# Patient Record
Sex: FEMALE | Race: BLACK OR AFRICAN AMERICAN | NOT HISPANIC OR LATINO | URBAN - METROPOLITAN AREA
[De-identification: names, ages, dates, MRNs, and addresses within clinical notes are randomized per-mention and may not be internally consistent; named-entity substitution may affect disease eponyms.]

---

## 2023-05-24 ENCOUNTER — INPATIENT (INPATIENT)
Facility: HOSPITAL | Age: 28
LOS: 2 days | Discharge: HOME | DRG: 818 | End: 2023-05-27
Attending: OBSTETRICS & GYNECOLOGY | Admitting: OBSTETRICS & GYNECOLOGY
Payer: COMMERCIAL

## 2023-05-24 ENCOUNTER — APPOINTMENT (OUTPATIENT)
Dept: CARDIOLOGY | Facility: CLINIC | Age: 28
End: 2023-05-24
Payer: COMMERCIAL

## 2023-05-24 ENCOUNTER — NON-APPOINTMENT (OUTPATIENT)
Age: 28
End: 2023-05-24

## 2023-05-24 ENCOUNTER — APPOINTMENT (OUTPATIENT)
Dept: ELECTROPHYSIOLOGY | Facility: CLINIC | Age: 28
End: 2023-05-24
Payer: COMMERCIAL

## 2023-05-24 VITALS
HEART RATE: 104 BPM | SYSTOLIC BLOOD PRESSURE: 110 MMHG | OXYGEN SATURATION: 99 % | HEIGHT: 66 IN | BODY MASS INDEX: 31.5 KG/M2 | WEIGHT: 196 LBS | DIASTOLIC BLOOD PRESSURE: 70 MMHG | TEMPERATURE: 98.1 F

## 2023-05-24 VITALS — DIASTOLIC BLOOD PRESSURE: 70 MMHG | SYSTOLIC BLOOD PRESSURE: 110 MMHG

## 2023-05-24 VITALS
SYSTOLIC BLOOD PRESSURE: 107 MMHG | HEIGHT: 66 IN | RESPIRATION RATE: 20 BRPM | OXYGEN SATURATION: 98 % | TEMPERATURE: 98 F | DIASTOLIC BLOOD PRESSURE: 74 MMHG | WEIGHT: 195.99 LBS | HEART RATE: 103 BPM

## 2023-05-24 VITALS
WEIGHT: 194 LBS | HEART RATE: 93 BPM | BODY MASS INDEX: 31.18 KG/M2 | SYSTOLIC BLOOD PRESSURE: 114 MMHG | OXYGEN SATURATION: 99 % | DIASTOLIC BLOOD PRESSURE: 81 MMHG | HEIGHT: 66 IN

## 2023-05-24 DIAGNOSIS — Z87.42 PERSONAL HISTORY OF OTHER DISEASES OF THE FEMALE GENITAL TRACT: ICD-10-CM

## 2023-05-24 DIAGNOSIS — Z87.19 PERSONAL HISTORY OF OTHER DISEASES OF THE DIGESTIVE SYSTEM: ICD-10-CM

## 2023-05-24 DIAGNOSIS — E28.2 POLYCYSTIC OVARIAN SYNDROME: ICD-10-CM

## 2023-05-24 DIAGNOSIS — Z98.890 OTHER SPECIFIED POSTPROCEDURAL STATES: Chronic | ICD-10-CM

## 2023-05-24 DIAGNOSIS — Z82.49 FAMILY HISTORY OF ISCHEMIC HEART DISEASE AND OTHER DISEASES OF THE CIRCULATORY SYSTEM: ICD-10-CM

## 2023-05-24 DIAGNOSIS — R55 SYNCOPE AND COLLAPSE: ICD-10-CM

## 2023-05-24 DIAGNOSIS — Z3A.30 30 WEEKS GESTATION OF PREGNANCY: ICD-10-CM

## 2023-05-24 DIAGNOSIS — Z78.9 OTHER SPECIFIED HEALTH STATUS: ICD-10-CM

## 2023-05-24 PROBLEM — Z00.00 ENCOUNTER FOR PREVENTIVE HEALTH EXAMINATION: Status: ACTIVE | Noted: 2023-05-24

## 2023-05-24 LAB
ALBUMIN SERPL ELPH-MCNC: 3.7 G/DL — SIGNIFICANT CHANGE UP (ref 3.3–5)
ALP SERPL-CCNC: 93 U/L — SIGNIFICANT CHANGE UP (ref 40–120)
ALT FLD-CCNC: 37 U/L — SIGNIFICANT CHANGE UP (ref 10–45)
ANION GAP SERPL CALC-SCNC: 13 MMOL/L — SIGNIFICANT CHANGE UP (ref 5–17)
APPEARANCE UR: ABNORMAL
AST SERPL-CCNC: 20 U/L — SIGNIFICANT CHANGE UP (ref 10–40)
BACTERIA # UR AUTO: ABNORMAL
BASOPHILS # BLD AUTO: 0.02 K/UL — SIGNIFICANT CHANGE UP (ref 0–0.2)
BASOPHILS NFR BLD AUTO: 0.2 % — SIGNIFICANT CHANGE UP (ref 0–2)
BILIRUB SERPL-MCNC: 0.1 MG/DL — LOW (ref 0.2–1.2)
BILIRUB UR-MCNC: NEGATIVE — SIGNIFICANT CHANGE UP
BUN SERPL-MCNC: 7 MG/DL — SIGNIFICANT CHANGE UP (ref 7–23)
CALCIUM SERPL-MCNC: 9.1 MG/DL — SIGNIFICANT CHANGE UP (ref 8.4–10.5)
CHLORIDE SERPL-SCNC: 105 MMOL/L — SIGNIFICANT CHANGE UP (ref 96–108)
CO2 SERPL-SCNC: 19 MMOL/L — LOW (ref 22–31)
COLOR SPEC: YELLOW — SIGNIFICANT CHANGE UP
CREAT SERPL-MCNC: 0.58 MG/DL — SIGNIFICANT CHANGE UP (ref 0.5–1.3)
DIFF PNL FLD: NEGATIVE — SIGNIFICANT CHANGE UP
EGFR: 126 ML/MIN/1.73M2 — SIGNIFICANT CHANGE UP
EOSINOPHIL # BLD AUTO: 0.03 K/UL — SIGNIFICANT CHANGE UP (ref 0–0.5)
EOSINOPHIL NFR BLD AUTO: 0.3 % — SIGNIFICANT CHANGE UP (ref 0–6)
EPI CELLS # UR: 7 /HPF — HIGH
GLUCOSE SERPL-MCNC: 101 MG/DL — HIGH (ref 70–99)
GLUCOSE UR QL: NEGATIVE — SIGNIFICANT CHANGE UP
HCT VFR BLD CALC: 35.1 % — SIGNIFICANT CHANGE UP (ref 34.5–45)
HGB BLD-MCNC: 11.1 G/DL — LOW (ref 11.5–15.5)
HYALINE CASTS # UR AUTO: 23 /LPF — HIGH (ref 0–2)
IMM GRANULOCYTES NFR BLD AUTO: 1.8 % — HIGH (ref 0–0.9)
KETONES UR-MCNC: NEGATIVE — SIGNIFICANT CHANGE UP
LEUKOCYTE ESTERASE UR-ACNC: ABNORMAL
LYMPHOCYTES # BLD AUTO: 1.69 K/UL — SIGNIFICANT CHANGE UP (ref 1–3.3)
LYMPHOCYTES # BLD AUTO: 18 % — SIGNIFICANT CHANGE UP (ref 13–44)
MCHC RBC-ENTMCNC: 26.5 PG — LOW (ref 27–34)
MCHC RBC-ENTMCNC: 31.6 GM/DL — LOW (ref 32–36)
MCV RBC AUTO: 83.8 FL — SIGNIFICANT CHANGE UP (ref 80–100)
MONOCYTES # BLD AUTO: 0.57 K/UL — SIGNIFICANT CHANGE UP (ref 0–0.9)
MONOCYTES NFR BLD AUTO: 6.1 % — SIGNIFICANT CHANGE UP (ref 2–14)
NEUTROPHILS # BLD AUTO: 6.9 K/UL — SIGNIFICANT CHANGE UP (ref 1.8–7.4)
NEUTROPHILS NFR BLD AUTO: 73.6 % — SIGNIFICANT CHANGE UP (ref 43–77)
NITRITE UR-MCNC: NEGATIVE — SIGNIFICANT CHANGE UP
NRBC # BLD: 0 /100 WBCS — SIGNIFICANT CHANGE UP (ref 0–0)
PH UR: 6.5 — SIGNIFICANT CHANGE UP (ref 5–8)
PLATELET # BLD AUTO: 185 K/UL — SIGNIFICANT CHANGE UP (ref 150–400)
POTASSIUM SERPL-MCNC: 3.7 MMOL/L — SIGNIFICANT CHANGE UP (ref 3.5–5.3)
POTASSIUM SERPL-SCNC: 3.7 MMOL/L — SIGNIFICANT CHANGE UP (ref 3.5–5.3)
PROT SERPL-MCNC: 6.8 G/DL — SIGNIFICANT CHANGE UP (ref 6–8.3)
PROT UR-MCNC: ABNORMAL
RBC # BLD: 4.19 M/UL — SIGNIFICANT CHANGE UP (ref 3.8–5.2)
RBC # FLD: 14.2 % — SIGNIFICANT CHANGE UP (ref 10.3–14.5)
RBC CASTS # UR COMP ASSIST: 2 /HPF — SIGNIFICANT CHANGE UP (ref 0–4)
SODIUM SERPL-SCNC: 137 MMOL/L — SIGNIFICANT CHANGE UP (ref 135–145)
SP GR SPEC: 1.03 — HIGH (ref 1.01–1.02)
UROBILINOGEN FLD QL: ABNORMAL
WBC # BLD: 9.38 K/UL — SIGNIFICANT CHANGE UP (ref 3.8–10.5)
WBC # FLD AUTO: 9.38 K/UL — SIGNIFICANT CHANGE UP (ref 3.8–10.5)
WBC UR QL: 170 /HPF — HIGH (ref 0–5)

## 2023-05-24 PROCEDURE — 99285 EMERGENCY DEPT VISIT HI MDM: CPT

## 2023-05-24 PROCEDURE — 99215 OFFICE O/P EST HI 40 MIN: CPT | Mod: 25

## 2023-05-24 PROCEDURE — 93000 ELECTROCARDIOGRAM COMPLETE: CPT

## 2023-05-24 PROCEDURE — 93000 ELECTROCARDIOGRAM COMPLETE: CPT | Mod: 77

## 2023-05-24 PROCEDURE — 99223 1ST HOSP IP/OBS HIGH 75: CPT

## 2023-05-24 PROCEDURE — 99204 OFFICE O/P NEW MOD 45 MIN: CPT | Mod: 25

## 2023-05-24 RX ORDER — IBUPROFEN 200 MG
TABLET ORAL DAILY
Refills: 0 | Status: ACTIVE | COMMUNITY
Start: 2023-05-24

## 2023-05-24 RX ORDER — FAMOTIDINE 10 MG/ML
20 INJECTION INTRAVENOUS ONCE
Refills: 0 | Status: COMPLETED | OUTPATIENT
Start: 2023-05-24 | End: 2023-05-24

## 2023-05-24 RX ORDER — FAMOTIDINE 20 MG/1
20 TABLET, FILM COATED ORAL DAILY
Refills: 0 | Status: ACTIVE | COMMUNITY
Start: 2023-05-24

## 2023-05-24 RX ORDER — FERROUS SULFATE 325(65) MG
325 (65 FE) TABLET ORAL DAILY
Refills: 0 | Status: ACTIVE | COMMUNITY
Start: 2023-05-24

## 2023-05-24 RX ADMIN — FAMOTIDINE 20 MILLIGRAM(S): 10 INJECTION INTRAVENOUS at 22:18

## 2023-05-24 NOTE — ED CLERICAL - NS ED CLERK NOTE PRE-ARRIVAL INFORMATION; ADDITIONAL PRE-ARRIVAL INFORMATION
CC/Reason For referral: 30 weeks of pregnancy, fell down the stairs, has been passing out, sent for eval  Preferred Consultant(if applicable): Min  Who admits for you (if needed): hospitalist  Do you have documents you would like to fax over? no  Would you still like to speak to an ED attending? yes

## 2023-05-24 NOTE — ED PROVIDER NOTE - ATTENDING APP SHARED VISIT CONTRIBUTION OF CARE
Private Physician Ronnie, PCP, Roddy Cards  27y female PMH Gerd, Anemia, PCOS, pt LMP 10/24/22 now 30w gest, PT comes to ed c/o mulitple episodes of synocpe since 12w gest. Last 5/8/23. Now comes to and referred to ed after seeing md today. pt has no prodrome  Steffen Lr MD, Facep Private Physician Ronnie, PCP, Roddy Cards  27y female PMH Gerd, Anemia, PCOS, pt LMP 10/24/22 now 30w gest, PT comes to ed c/o mulitple episodes of synocpe since 12w gest. Last 5/8/23. Now comes to and referred to ed after seeing md today. pt has no prodrome. PE WDWN feamle awake alert normocephalic atraumatic neck supple chest clear anterior & posterior cv no rubs, gallops or murmurs, abd soft +bs neuro no focal defects  Steffen Lr MD, Facep

## 2023-05-24 NOTE — ASSESSMENT
[FreeTextEntry1] : reviewed recent images of pt with syncope fell down stairs despite prior w/u negative still much concern to refer to er ashleeet  will defer labs  and opd w/u urgent er eval

## 2023-05-24 NOTE — ED ADULT NURSE NOTE - OBJECTIVE STATEMENT
27y F A&Ox4 sent in by PCP for cardiac evaluation. Pt states that on may 8th she synopsized and was taken to the hospital. Today Pt when to PCP for a follow up and was told to come to the ED for a further cardiac evaluation. Pt is also 30 weeks pregnant ([id number]). Pt states that she has had the feeling of passing out, intermittent CP and palpitations but no symptoms at this time. Pt states her PCP told her EKG shows PVS's and 2* type 2 [place]. PT also states she is feeling heartburn after eating. On assessment pt comfortable, no complaints of pain or discomfort other that the heart burn s/p eating. Denies any N/V/D/CP/SOB/Gi/Gu symptoms. PMH of GERD, anemia and polycystic ovarian cysts. PSH of Breast reduction and Ovarian cyst removal. VSS

## 2023-05-24 NOTE — ED ADULT NURSE NOTE - NSFALLUNIVINTERV_ED_ALL_ED
Previously Negative (within the last year) Bed/Stretcher in lowest position, wheels locked, appropriate side rails in place/Call bell, personal items and telephone in reach/Instruct patient to call for assistance before getting out of bed/chair/stretcher/Non-slip footwear applied when patient is off stretcher/[place] to call system/Physically safe environment - no spills, clutter or unnecessary equipment/Purposeful proactive rounding/Room/bathroom lighting operational, light cord in reach

## 2023-05-24 NOTE — ED ADULT NURSE NOTE - CHIEF COMPLAINT QUOTE
since 12 weeks pregnant, pt has multiple syncope, last was  May 8, admitted in [facility], followed up with cardiology today for palpitations, sent here to be admitted. pt is 30 weeks pregnant at present

## 2023-05-24 NOTE — CONSULT NOTE ADULT - SUBJECTIVE AND OBJECTIVE BOX
DATE OF SERVICE: 05-24-23 @ 18:49    CHIEF COMPLAINT:Patient is a 27y old  Female who presents with a chief complaint of syncope.    HISTORY OF PRESENT ILLNESS: Ms. Sosa is a 27 year old female with no significant PMH who is currently 30 weeks pregnant and presents with multiple episodes of syncope and near syncope since 12 weeks gestations. Of note, patient is high risk for she has had 2 early miscarriages and an ectopic pregnancy. She states that the syncopal episodes are usually preceded by palpitations, chest pressure, ringing in the ears, weakness in lower extremity and visual changes. The most recent syncopal episode on 5/8 was without promodal symtoms and was traumatic (patient fell down a flight of stairs). She was evaluated at [facility] where and TTE was done and discharged for OP cardiology follow up. She has just completed holter monitoring. She also reports NIELSON and mild LE edema but unsure if it is 2/2 pregnancy. Was sent in by Dr. Chahal following his initial evaluation for further workup. Patient currently denies CP, palpitations, orthopnea or pND.       PAST MEDICAL & SURGICAL HISTORY:          MEDICATIONS:      FAMILY HISTORY:  Denies significant family history (some unknown d/t mother adopted and father with no relationship to parent)    Non-contributory    SOCIAL HISTORY:    [- ] Tobacco  [- ] Drugs  [ ] Alcohol    Allergies    Tomatoes (Hives)  methylPREDNISolone (Hives)  latex (Rash)    Intolerances    	    REVIEW OF SYSTEMS:  CONSTITUTIONAL: No fever  EYES: No eye pain, visual disturbances, or discharge  ENMT:  No difficulty hearing, tinnitus  NECK: No pain or stiffness  RESPIRATORY: No cough, wheezing,  CARDIOVASCULAR: + chest pain, + palpitations, + passing out, + dizziness, + leg swelling  GASTROINTESTINAL:  No nausea, vomiting, diarrhea or constipation. No melena.  GENITOURINARY: No dysuria, hematuria  NEUROLOGICAL: No stroke like symptoms  SKIN: No burning or lesions   ENDOCRINE: No heat or cold intolerance  MUSCULOSKELETAL: No joint pain or swelling  PSYCHIATRIC: No  anxiety, mood swings  HEME/LYMPH: No bleeding gums  ALLERGY AND IMMUNOLOGIC: No hives or eczema	    All other ROS negative    PHYSICAL EXAM:  T(C): 36.8 (05-24-23 @ 16:20), Max: 36.8 (05-24-23 @ 16:20)  HR: 103 (05-24-23 @ 16:20) (103 - 103)  BP: 107/74 (05-24-23 @ 16:20) (107/74 - 107/74)  RR: 20 (05-24-23 @ 16:20) (20 - 20)  SpO2: 98% (05-24-23 @ 16:20) (98% - 98%)  Wt(kg): --  I&O's Summary      Appearance: Normal	  HEENT:   Normal oral mucosa, EOMI	  Cardiovascular:  S1 S2, No JVD,  + gallop  Respiratory: Lungs clear to auscultation	  Psychiatry: Alert  Gastrointestinal:  Soft, Non-tender, + BS	  Skin: No rashes   Neurologic: Non-focal  Extremities:  No edema  Vascular: Peripheral pulses palpable    	    	  	  CARDIAC MARKERS:  Labs personally reviewed by me          EKG: Personally reviewed by me - pending  Radiology: Personally reviewed by me -       Assessment /Plan:     Ms. Sosa is a 27 year old female with no significant PMH who is currently 30 weeks pregnant and presents with multiple episodes of syncope and near syncope since 12 weeks gestations. Of note, patient is high risk for she has had 2 early miscarriages and an ectopic pregnancy. She states that the syncopal episodes are usually preceded by palpitations, chest pressure, ringing in the ears, weakness in lower extremity and visual changes. The most recent syncopal episode on 5/8 was without promodal symtoms and was traumatic (patient fell down a flight of stairs). She was evaluated at [facility] where and TTE was done and discharged for OP cardiology follow up. She has just completed holter monitoring. She also reports NIELSON and mild LE edema but unsure if it is 2/2 pregnancy. Was sent in by Dr. Chahal following his initial evaluation for further workup. Patient currently denies CP, palpitations, orthopnea or pND.     1. Syncope  - Multiple syncopal episodes with most recent episode with trauma and no prodrome concerning with possible conduction or structural defects  - Some syncopal episodes a/b chest pressure and palpitations  - Obtain ECG  - Will obtain and review Holter results  - Obtain formal TTE  - Will consult EP pending review of the above  - Monitor on tele  - CBC/CMP to r/o anemia/electrolyte abnormalities  - Recommend admission for further workup    2. High Risk Pregnancy  - Appreciate Ob/Gyn recommendations for monitoring of mother/baby      Differential diagnosis and plan of care discussed with patient after the evaluation. Counseling on diet, nutritional counseling, weight management, exercise and medication compliance was done.   Advanced care planning/advanced directives discussed with patient/family. DNR status including forceful chest compressions to attempt to restart the heart, ventilator support/artificial breathing, electric shock, artificial nutrition, health care proxy, Molst form all discussed with pt. Pt wishes to consider. More than fifteen minutes spent on discussing advanced directives.     Shandra Denson [facility]  Anoop Pereyra DO [facility]  Cardiovascular Medicine  [address] [place] [address] Dr, [address]  Available for call or text via [employer] TEAMs  Office [phone]   DATE OF SERVICE: 05-24-23 @ 18:49    CHIEF COMPLAINT:Patient is a 27y old  Female who presents with a chief complaint of syncope.    HISTORY OF PRESENT ILLNESS: Ms. Sosa is a 27 year old female with no significant PMH who is currently 30 weeks pregnant and presents with multiple episodes of syncope and near syncope since 12 weeks gestations. Of note, patient is high risk for she has had 2 early miscarriages and an ectopic pregnancy. She states that the syncopal episodes are usually preceded by palpitations, chest pressure, ringing in the ears, weakness in lower extremity and visual changes. The most recent syncopal episode on 5/8 was without promodal symtoms and was traumatic (patient fell down a flight of stairs). She was evaluated at [facility] where and TTE was done and discharged for OP cardiology follow up. She has just completed holter monitoring. She also reports NIELSON and mild LE edema but unsure if it is 2/2 pregnancy. Was sent in by Dr. Chahal following his initial evaluation for further workup. Patient currently denies CP, palpitations, orthopnea or pND.       PAST MEDICAL & SURGICAL HISTORY:          MEDICATIONS:      FAMILY HISTORY:  Denies significant family history (some unknown d/t mother adopted and father with no relationship to parent)    Non-contributory    SOCIAL HISTORY:    [- ] Tobacco  [- ] Drugs  [ ] Alcohol    Allergies    Tomatoes (Hives)  methylPREDNISolone (Hives)  latex (Rash)    Intolerances    	    REVIEW OF SYSTEMS:  CONSTITUTIONAL: No fever  EYES: No eye pain, visual disturbances, or discharge  ENMT:  No difficulty hearing, tinnitus  NECK: No pain or stiffness  RESPIRATORY: No cough, wheezing,  CARDIOVASCULAR: + chest pain, + palpitations, + passing out, + dizziness, + leg swelling  GASTROINTESTINAL:  No nausea, vomiting, diarrhea or constipation. No melena.  GENITOURINARY: No dysuria, hematuria  NEUROLOGICAL: No stroke like symptoms  SKIN: No burning or lesions   ENDOCRINE: No heat or cold intolerance  MUSCULOSKELETAL: No joint pain or swelling  PSYCHIATRIC: No  anxiety, mood swings  HEME/LYMPH: No bleeding gums  ALLERGY AND IMMUNOLOGIC: No hives or eczema	    All other ROS negative    PHYSICAL EXAM:  T(C): 36.8 (05-24-23 @ 16:20), Max: 36.8 (05-24-23 @ 16:20)  HR: 103 (05-24-23 @ 16:20) (103 - 103)  BP: 107/74 (05-24-23 @ 16:20) (107/74 - 107/74)  RR: 20 (05-24-23 @ 16:20) (20 - 20)  SpO2: 98% (05-24-23 @ 16:20) (98% - 98%)  Wt(kg): --  I&O's Summary      Appearance: Normal	  HEENT:   Normal oral mucosa, EOMI	  Cardiovascular:  S1 S2, No JVD,  + gallop  Respiratory: Lungs clear to auscultation	  Psychiatry: Alert  Gastrointestinal:  Soft, Non-tender, + BS	  Skin: No rashes   Neurologic: Non-focal  Extremities:  No edema  Vascular: Peripheral pulses palpable    	    	  	  CARDIAC MARKERS:  Labs personally reviewed by me          EKG: Personally reviewed by me - pending  Radiology: Personally reviewed by me -       Assessment /Plan:     Ms. Sosa is a 27 year old female with no significant PMH who is currently 30 weeks pregnant and presents with multiple episodes of syncope and near syncope since 12 weeks gestations. Of note, patient is high risk for she has had 2 early miscarriages and an ectopic pregnancy. She states that the syncopal episodes are usually preceded by palpitations, chest pressure, ringing in the ears, weakness in lower extremity and visual changes. The most recent syncopal episode on 5/8 was without promodal symtoms and was traumatic (patient fell down a flight of stairs). She was evaluated at [facility] where and TTE was done and discharged for OP cardiology follow up. She has just completed holter monitoring. She also reports NIELSON and mild LE edema but unsure if it is 2/2 pregnancy. Was sent in by Dr. Chahal following his initial evaluation for further workup. Patient currently denies CP, palpitations, orthopnea or pND.     1. Syncope  - Multiple syncopal episodes with most recent episode with trauma and no prodrome concerning with possible conduction or structural defects  - Some syncopal episodes a/b chest pressure and palpitations  - Obtain ECG  - Will obtain and review Holter results  - Obtain formal TTE  - Will consult EP pending review of the above  - Monitor on tele  - CBC/CMP to r/o anemia/electrolyte abnormalities  - Recommend admission for further workup    2. High Risk Pregnancy  - Appreciate Ob/Gyn recommendations for monitoring of mother/baby      Differential diagnosis and plan of care discussed with patient after the evaluation. Counseling on diet, nutritional counseling, weight management, exercise and medication compliance was done.   Advanced care planning/advanced directives discussed with patient/family. DNR status including forceful chest compressions to attempt to restart the heart, ventilator support/artificial breathing, electric shock, artificial nutrition, health care proxy, Molst form all discussed with pt. Pt wishes to consider. More than fifteen minutes spent on discussing advanced directives.         Shandra Denson [facility]  Anoop Pereyra DO [facility]  Cardiovascular Medicine  [address] [place] [address] Dr, [address]  Available for call or text via [employer] TEAMs  Office [phone]

## 2023-05-24 NOTE — HISTORY OF PRESENT ILLNESS
[FreeTextEntry1] : This is a 28 y/o 30 weeks pregnant ( 2 miscarriages  ( 4 weeks and 5 weeks) 1 ectopic pregnancy (2018)  female here to establish cardiac care. Pt with hx of episodes of syncope and collapse. She was seen by Dr. Alves today and will be having ILR placed friday.  pt reports shes been passing out since 12 weeks of pregnancy reports chest tightness dizziness and heart palpations \par \par pt seen by cardio had echo and 25 Holter and a 30 days monitor ended may 3rd. pt then passed out on may 8th passed out and fell down the stairs. MRI brain and Ct head normal had EEG however does not have reports pt reports since may8th shes been feeling heart palsp every day and dizziness les any further passing out .

## 2023-05-24 NOTE — ED PROVIDER NOTE - RAPID ASSESSMENT
27y F 30wks pregnant w/ pmhx anemia presents to the ED 30w pregnant c/o palpitations, multiple episodes of syncope. Pt was sent for admission by cardiologist. Pt reports she fell on May 8, and was admitted for a week at [facility] and then was told to follow up with cardiology. LMP was October 24th. Denies cp, sob, dizziness, vaginal bleeding, leakage of fluid. Pt states that she feels the baby normally.    Max POWER (Vaniibe) have documented this rapid assessment note under the dictation of Berna Everett) which has been reviewed and affirmed to be accurate. Patient was seen as a QDOC patient. The patient will be seen and further worked up in the main emergency department and their care will be completed by the main emergency department team along with a thorough physical exam. Receiving team will follow up on labs, analgesia, any clinical imaging, reassess and disposition as clinically indicated, all decisions regarding the progression of care will be made at their discretion. 40 F currently 30 weeks pregnant presents to the ER w/ freq syncope, pt w/ intermittent cp, but is now resolved, went to see Dr. Chahal and was told to come to the Er for admission. pt w/ no fevers, no chills, no nausea no vomiting, feeling baby move , no vaginal bleeding, no lof, pt reports lmp was Oct 24, pt follows w/ Dr. Araujo, she reports she was hospitalized in [place] and told she had a ?block, was told to follow up w/ EP.    Attending Note --     I saw the patient waiting area ; a brief history was taken and a thorough physical exam was not performed as there is no physical exam room available.  The patient will be seen and further worked up in the main emergency department and their care will be completed by the main emergency department team.  I was not involved in this patient's care during the QDOC process, and unless otherwise noted in the ED provider note, was not involved in their care during their ED course.    The scribe's documentation has been prepared under my direction and personally reviewed by me in its entirety. I confirm that the note above accurately reflects all work, treatment, procedures, and medical decision making performed by me  Max Llanos (Scribe) have documented this rapid assessment note under the dictation of Berna Everett) which has been reviewed and affirmed to be accurate. Patient was seen as a QDOC patient. The patient will be seen and further worked up in the main emergency department and their care will be completed by the main emergency department team along with a thorough physical exam. Receiving team will follow up on labs, analgesia, any clinical imaging, reassess and disposition as clinically indicated, all decisions regarding the progression of care will be made at their discretion.

## 2023-05-24 NOTE — ED PROVIDER NOTE - PHYSICAL EXAMINATION
CONSTITUTIONAL: Patient is awake, alert and oriented x 3. Patient is well appearing and in no acute distress  HEAD: NCAT  NECK: supple, FROM  LUNGS: CTA b/l, no wheezing or rales   HEART: RRR.+S1S2 no murmurs  ABDOMEN: Gravid abdomen, soft, non-distended, nttp, no rebound or guarding  EXTREMITY: no edema or calf tenderness b/l, FROM upper and lower ext b/l  SKIN: with no rash or lesions  NEURO: No focal deficits

## 2023-05-24 NOTE — ED PROVIDER NOTE - OBJECTIVE STATEMENT
27 year old female  currently 30 weeks pregnant and presents with multiple episodes of syncope and near syncope since 12 weeks gestations. Of note, patient is high risk for she has had 2 early miscarriages and an ectopic pregnancy. She states that the syncopal episodes are usually preceded by palpitations, chest pressure, ringing in the ears, weakness in lower extremity and visual changes. The most recent syncopal episode on  was without promodal symtoms and was traumatic (patient fell down a flight of stairs). She was evaluated at [facility] where and TTE was done and discharged for OP cardiology follow up. She has just completed holter monitoring. She also reports NIELSON and mild LE edema but unsure if it is 2/2 pregnancy. Was sent in by Dr. Chahal following his initial evaluation for further workup. Patient currently denies CP, palpitations, orthopnea or pND. 27 year old female  currently 30 weeks pregnant (due , follows with Dr. Hernandez) with presents to ED at request of her Cardiologist for work-up of   syncopal episodes. Pt reports she had severe hyperemesis in the beginning of her pregnancy and reports she has had syncope since 12 weeks gestation. She states that initially her syncopal episodes were preceded by palpitations, and sensation of chest fluttering but recently she has had no preceding symptoms during recent syncopal events. Patient had a work-up at OSH and advised to follow up with EP. She was referred to her current cardiologist who referred her to ED for inpatient work-up.  She reports mild NIELSON and mild LE edema which she attributes to pregnancy. She reports some dysuria noticed today. She denies HA, dizziness, fevers, chills, chest pain, cough,  abd pain, n/v/d, vaginal bleeding or discharge

## 2023-05-24 NOTE — PHYSICAL EXAM

## 2023-05-24 NOTE — ED PROVIDER NOTE - CLINICAL SUMMARY MEDICAL DECISION MAKING FREE TEXT BOX
Young adult female 30 w gest pw multiple episodes of syncope. Referred to ed for admit/tele and cards gallo. Check, labs, ekg trop, cs gyn  Steffen Lr MD, Facep

## 2023-05-24 NOTE — ED ADULT NURSE NOTE - NSICDXPASTSURGICALHX_GEN_ALL_CORE_FT
PAST SURGICAL HISTORY:  History of bilateral breast reduction surgery     History of ovarian cystectomy

## 2023-05-24 NOTE — PHYSICAL EXAM
Last office visit with Dr. Robertson on 4/12/19    Current [facility] referral order is in effect from 7/25/18 to 7/25/19.      INR  Date  NEW:         2.10  5/2/19        Desired Range: 2.0 - 3.0  Patient notified via wife Jacki (vr)  ________________________________________________________________  Previous:  2.6  4/3/19    Dose Maintained.  Currently takes Warfarin 9 mg Tuesday and 10 mg x 6 days  1 mg tablets and 5 mg tablets   ________________________________________________________________    5/3/19 NEW PLAN OF CARE :  1) Reinforcement of Education: Intoroducation to Warfarin (MOA, side effects, contraindications, inications)., Proper administration (day, time relationship to meals, missed doses, ect)., INR testing (importance and reason for repeated testing)., Signs/symptoms of bleeding and reporting to physician., Dietary intake of vitamin K (dietary sources, other sources (i.e vitamins)., Drug interactions ( importance of notifiying [facility] of medication changes - additions and discontinuations)., Anticoagulation identification (bracelet, necklace or wallet card)., What to do in the event of a future elective or emergency surgery or procedure., Effects of alcohol, tobacco, and exercise on the INR and effects of Warfarin., Travel preparations., Physical activity and the risk of falls., Medications, over-the-counter drugs, and supplements to avoid unless approved by the physician (e.g aspirin, non-steroidal anti-inflammatory drugs, herbals or homeopathic therapies,ect.)., Contacting the ACC if placed  on Antibiotic therapy., Contacting the ACC for Warfarin refills., Importance of compliance and the dangers of self-adjusting the dose. and  Actions to take when you are sick (colds, flu, diarrhea,ect.).    2) Kwan Saldana is to Maintain dose of 9 mg Tuesday and 10 mg x 6 days of Warfarin   3) 1 + 5  mg tabs  4) Next INR recheck in4 weeks, around 5/30/19, at the Home via [employer] home monitor  5) Vitamin K intake instructions:  maintain current vitamin K intake.  6) Tracking updated (see the telephone encounter dated 5/2/19).     Kwan Saldana notified of new INR result via wife Jacki (brett).  Patient states there have been no changes in diet or medications, physical activity, or alcohol.   Patient states she has not missed any doses.  Patient denies any recent illnesses, fever, nausea, vomiting or diarrhea.   Patient denies any bruising/bleeding and shortness of breath/swelling.  Patient confirms having followed the nurse's previous dosing instructions.  Patient will now follow the 5/3/19 NEW PLAN OF CARE.  Anticipated INR recheck date: 5/30/19 at Home via home monitor  Patient does need a refill for warfarin. Pharmacy--- USE [facility] on [address] in [place].  Wife requested that refill be sent to [facility] no [facility].     [Well Developed] : well developed [Well Nourished] : well nourished [No Acute Distress] : no acute distress [Normal Conjunctiva] : normal conjunctiva [Normal Venous Pressure] : normal venous pressure [No Carotid Bruit] : no carotid bruit [Normal] : normal S1, S2, no murmur, no rub, no gallop [Normal S1, S2] : normal S1, S2 [No Murmur] : no murmur [No Rub] : no rub [No Gallop] : no gallop [Clear Lung Fields] : clear lung fields [Good Air Entry] : good air entry [No Respiratory Distress] : no respiratory distress  [Soft] : abdomen soft [Non Tender] : non-tender [No Masses/organomegaly] : no masses/organomegaly [Normal Bowel Sounds] : normal bowel sounds [Normal Gait] : normal gait [No Edema] : no edema [No Cyanosis] : no cyanosis [No Clubbing] : no clubbing [No Varicosities] : no varicosities [No Rash] : no rash [No Skin Lesions] : no skin lesions [Moves all extremities] : moves all extremities [No Focal Deficits] : no focal deficits [Normal Speech] : normal speech [Alert and Oriented] : alert and oriented [Normal memory] : normal memory

## 2023-05-25 LAB
BASOPHILS # BLD AUTO: 0.02 K/UL — SIGNIFICANT CHANGE UP (ref 0–0.2)
BASOPHILS NFR BLD AUTO: 0.2 % — SIGNIFICANT CHANGE UP (ref 0–2)
EOSINOPHIL # BLD AUTO: 0.04 K/UL — SIGNIFICANT CHANGE UP (ref 0–0.5)
EOSINOPHIL NFR BLD AUTO: 0.4 % — SIGNIFICANT CHANGE UP (ref 0–6)
HBV SURFACE AG SERPL QL IA: SIGNIFICANT CHANGE UP
HCT VFR BLD CALC: 32.9 % — LOW (ref 34.5–45)
HGB BLD-MCNC: 10.4 G/DL — LOW (ref 11.5–15.5)
HIV 1+2 AB+HIV1 P24 AG SERPL QL IA: SIGNIFICANT CHANGE UP
IMM GRANULOCYTES NFR BLD AUTO: 1.8 % — HIGH (ref 0–0.9)
LYMPHOCYTES # BLD AUTO: 1.79 K/UL — SIGNIFICANT CHANGE UP (ref 1–3.3)
LYMPHOCYTES # BLD AUTO: 19.3 % — SIGNIFICANT CHANGE UP (ref 13–44)
MCHC RBC-ENTMCNC: 26.4 PG — LOW (ref 27–34)
MCHC RBC-ENTMCNC: 31.6 GM/DL — LOW (ref 32–36)
MCV RBC AUTO: 83.5 FL — SIGNIFICANT CHANGE UP (ref 80–100)
MONOCYTES # BLD AUTO: 0.55 K/UL — SIGNIFICANT CHANGE UP (ref 0–0.9)
MONOCYTES NFR BLD AUTO: 5.9 % — SIGNIFICANT CHANGE UP (ref 2–14)
NEUTROPHILS # BLD AUTO: 6.71 K/UL — SIGNIFICANT CHANGE UP (ref 1.8–7.4)
NEUTROPHILS NFR BLD AUTO: 72.4 % — SIGNIFICANT CHANGE UP (ref 43–77)
NRBC # BLD: 0 /100 WBCS — SIGNIFICANT CHANGE UP (ref 0–0)
PLATELET # BLD AUTO: 173 K/UL — SIGNIFICANT CHANGE UP (ref 150–400)
RBC # BLD: 3.94 M/UL — SIGNIFICANT CHANGE UP (ref 3.8–5.2)
RBC # FLD: 14.1 % — SIGNIFICANT CHANGE UP (ref 10.3–14.5)
RUBV IGG SER-ACNC: 12.7 INDEX — SIGNIFICANT CHANGE UP
RUBV IGG SER-IMP: POSITIVE — SIGNIFICANT CHANGE UP
T PALLIDUM AB TITR SER: NEGATIVE — SIGNIFICANT CHANGE UP
TROPONIN T, HIGH SENSITIVITY RESULT: <6 NG/L — SIGNIFICANT CHANGE UP (ref 0–51)
WBC # BLD: 9.28 K/UL — SIGNIFICANT CHANGE UP (ref 3.8–10.5)
WBC # FLD AUTO: 9.28 K/UL — SIGNIFICANT CHANGE UP (ref 3.8–10.5)

## 2023-05-25 PROCEDURE — 99284 EMERGENCY DEPT VISIT MOD MDM: CPT | Mod: GC

## 2023-05-25 PROCEDURE — 99232 SBSQ HOSP IP/OBS MODERATE 35: CPT | Mod: 25

## 2023-05-25 PROCEDURE — 93356 MYOCRD STRAIN IMG SPCKL TRCK: CPT

## 2023-05-25 PROCEDURE — 93306 TTE W/DOPPLER COMPLETE: CPT | Mod: 26

## 2023-05-25 RX ORDER — HEPARIN SODIUM 5000 [USP'U]/ML
5000 INJECTION INTRAVENOUS; SUBCUTANEOUS EVERY 12 HOURS
Refills: 0 | Status: DISCONTINUED | OUTPATIENT
Start: 2023-05-25 | End: 2023-05-27

## 2023-05-25 RX ORDER — FAMOTIDINE 10 MG/ML
20 INJECTION INTRAVENOUS DAILY
Refills: 0 | Status: DISCONTINUED | OUTPATIENT
Start: 2023-05-25 | End: 2023-05-27

## 2023-05-25 RX ADMIN — HEPARIN SODIUM 5000 UNIT(S): 5000 INJECTION INTRAVENOUS; SUBCUTANEOUS at 05:37

## 2023-05-25 RX ADMIN — Medication 1 TABLET(S): at 13:49

## 2023-05-25 RX ADMIN — FAMOTIDINE 20 MILLIGRAM(S): 10 INJECTION INTRAVENOUS at 13:50

## 2023-05-25 RX ADMIN — HEPARIN SODIUM 5000 UNIT(S): 5000 INJECTION INTRAVENOUS; SUBCUTANEOUS at 18:35

## 2023-05-25 NOTE — CHART NOTE - NSCHARTNOTEFT_GEN_A_CORE
BPP performed in the setting of NR NST    BPP: vtx, posterior, MVP 7.9cm, BPP 8/8    Dayna Zhao, PGY-3

## 2023-05-25 NOTE — CONSULT NOTE ADULT - SUBJECTIVE AND OBJECTIVE BOX
Patient is a 27y old  Female who presents with a chief complaint of       PAST MEDICAL & SURGICAL HISTORY:  Anemia      GERD (gastroesophageal reflux disease)      Polycystic ovary      History of ovarian cystectomy      History of bilateral breast reduction surgery          HPI:  27 year old Female PMHx of  2 miscarriages and 1 ectopic pregnancy prior and  Mhx of GERD and Anemia who is  @30w3d (LAZ 23 by first trimester US)   presents with multiple syncopal episodes. Pt reports no prior history prior to pregnancy, presents recurrent syncopal episodes since 12 weeks of pregnancy.  She c/o having palpitations, dizziness and pass out randomly. Per chart review report , she Saw cardiologist Dr Kye Ayala who placed a holter for 30 days from 4/3/23 to 23 which showed 2 degree type 1 at 71 bpm. She was symptomatic while on the monitor but did not correlate with the monitor findings . She passed out and fell down 14 stairs on 23. Went to [facility], neurology consulted for seizures. CT head/ spine, MRI brain, EEG all negative. EKG showed SR. Echo showed LVEF 60-65%. She saw Dr. Alves in  the office with the plan to place ILR; however, was seen by cardiologist Dr. Chahal who ultimately reffered her to the ED for further evaluation. She reports no further syncope since 23.              PREVIOUS DIAGNOSTIC TESTING:      ECHO  FINDINGS:    STRESS  FINDINGS:    CATHETERIZATION  FINDINGS:    ELECTROPHYSIOLOGY STUDY  FINDINGS:    CAROTID ULTRASOUND:  FINDINGS    VENOUS DUPLEX SCAN:  FINDINGS:    CHEST CT PULMONARY ANGIO with IV Contrast:  FINDINGS:  MEDICATIONS  (STANDING):  famotidine    Tablet 20 milliGRAM(s) Oral daily  heparin   Injectable 5000 Unit(s) SubCutaneous every 12 hours  prenatal multivitamin 1 Tablet(s) Oral daily    MEDICATIONS  (PRN):      FAMILY HISTORY:      SOCIAL HISTORY:    CIGARETTES:    ALCOHOL:    REVIEW OF SYSTEMS:    CONSTITUTIONAL: No fever, weight loss, chills, shakes, or fatigue  EYES: No eye pain, visual disturbances, or discharge  ENMT:  No difficulty hearing, tinnitus, vertigo; No sinus or throat pain  NECK: No pain or stiffness  BREASTS: No pain, masses, or nipple discharge  RESPIRATORY: No cough, wheezing, hemoptysis, or shortness of breath  CARDIOVASCULAR: No chest pain, dyspnea, palpitations, dizziness, syncope, paroxysmal nocturnal dyspnea, orthopnea, or arm or leg swelling  GASTROINTESTINAL: No abdominal  or epigastric pain, nausea, vomiting, hematemesis, diarrhea, constipation, melena or bright red blood.  GENITOURINARY: No dysuria, nocturia, hematuria, or urinary incontinence  NEUROLOGICAL: No headaches, memory loss, slurred speech, limb weakness, loss of strength, numbness, or tremors  SKIN: No itching, burning, rashes, or lesions   LYMPH NODES: No enlarged glands  ENDOCRINE: No heat or cold intolerance, or hair loss  MUSCULOSKELETAL: No joint pain or swelling, muscle, back, or extremity pain  PSYCHIATRIC: No depression, anxiety, or difficulty sleeping  HEME/LYMPH: No easy bruising or bleeding gums  ALLERY AND IMMUNOLOGIC: No hives or rash.      Vital Signs Last 24 Hrs  T(C): 36.6 (25 May 2023 05:40), Max: 37.1 (25 May 2023 00:11)  T(F): 97.88 (25 May 2023 05:40), Max: 98.7 (25 May 2023 00:11)  HR: 88 (25 May 2023 05:40) (82 - 103)  BP: 107/72 (25 May 2023 05:40) (105/79 - 110/68)  BP(mean): --  RR: 18 (25 May 2023 05:40) (17 - 20)  SpO2: 98% (25 May 2023 01:33) (98% - 99%)    Parameters below as of 25 May 2023 01:33  Patient On (Oxygen Delivery Method): room air        PHYSICAL EXAM:    GENERAL: In no apparent distress, well nourished, and hydrated.  HEAD:  Atraumatic, Normocephalic  EYES: EOMI, PERRLA, conjunctiva and sclera clear  ENMT: No tonsillar erythema, exudates, or enlargement; Moist mucous membranes, Good dentition, No lesions  NECK: Supple and normal thyroid.  No JVD or carotid bruit.  Carotid pulse is 2+ bilaterally.  HEART: Regular rate and rhythm; No murmurs, rubs, or gallops.  PULMONARY: Clear to auscultation and perfusion.  No rales, wheezing, or rhonchi bilaterally.  ABDOMEN: Soft, Nontender, Nondistended; Bowel sounds present  EXTREMITIES:  2+ Peripheral Pulses, No clubbing, cyanosis, or edema  LYMPH: No lymphadenopathy noted  NEUROLOGICAL: Grossly nonfocal          INTERPRETATION OF TELEMETRY:    ECG:    I&O's Detail      LABS:                        10.4   9.28  )-----------( 173      ( 25 May 2023 06:07 )             32.9         137  |  105  |  7   ----------------------------<  101<H>  3.7   |  19<L>  |  0.58    Ca    9.1      24 May 2023 18:37    TPro  6.8  /  Alb  3.7  /  TBili  0.1<L>  /  DBili  x   /  AST  20  /  ALT  37  /  AlkPhos  93            Urinalysis Basic - ( 24 May 2023 21:25 )    Color: Yellow / Appearance: Slightly Turbid / S.033 / pH: x  Gluc: x / Ketone: Negative  / Bili: Negative / Urobili: 2 mg/dL   Blood: x / Protein: 30 mg/dL / Nitrite: Negative   Leuk Esterase: Large / RBC: 2 /hpf /  /HPF   Sq Epi: x / Non Sq Epi: x / Bacteria: Moderate      BNP  I&O's Detail    Daily Height in cm: 167.64 (25 May 2023 01:33)    Daily Weight Pre-pregnancy in k (25 May 2023 01:33)    RADIOLOGY & ADDITIONAL STUDIES: Patient is a 27y old  Female who presents with a chief complaint of       PAST MEDICAL & SURGICAL HISTORY:  Anemia      GERD (gastroesophageal reflux disease)      Polycystic ovary      History of ovarian cystectomy      History of bilateral breast reduction surgery          HPI:  27 year old Female PMHx of  2 miscarriages and 1 ectopic pregnancy prior and  Mhx of GERD and Anemia who is  @30w3d (LAZ 23 by first trimester US)   presents with multiple syncopal episodes. Pt reports no prior history prior to pregnancy, presents recurrent syncopal episodes since 12 weeks of pregnancy.  SHe reports approx 12 episodes since 23. SHe reports a prodrome of  having palpitations, general fatigue, blurry vision and then vision going dark prior to passing out. There were also two episodes of no prodrome. Per chart review report , she Saw cardiologist Dr Kye Ayala who placed a holter for 30 days from 4/3/23 to 23 which showed 2 degree type 1 at 71 bpm. She was symptomatic while on the monitor but did not correlate with the monitor findings . She passed out and fell down 14 stairs on 23. Went to [facility], neurology consulted for seizures. CT head/ spine, MRI brain, EEG all negative. EKG showed SR. Echo showed LVEF 60-65%. She saw Dr. Alves in  the office with the plan to place ILR; however, was seen by cardiologist Dr. Chahal who ultimately referred her to the ED for further evaluation. She reports no further syncope since 23.               PREVIOUS DIAGNOSTIC TESTING:      CHEST CT PULMONARY ANGIO with IV Contrast:  FINDINGS:  MEDICATIONS  (STANDING):  famotidine    Tablet 20 milliGRAM(s) Oral daily  heparin   Injectable 5000 Unit(s) SubCutaneous every 12 hours  prenatal multivitamin 1 Tablet(s) Oral daily    MEDICATIONS  (PRN):      FAMILY HISTORY:  noncontributory    SOCIAL HISTORY:    CIGARETTES:denies    ALCOHOL:occasional     No illicit drug use    REVIEW OF SYSTEMS:    CONSTITUTIONAL: No fever, weight loss, chills, shakes, or fatigue  EYES: No eye pain, visual disturbances, or discharge  ENMT:  No difficulty hearing, tinnitus, vertigo; No sinus or throat pain  NECK: No pain or stiffness  RESPIRATORY: No cough, wheezing, hemoptysis  CARDIOVASCULAR: No chest pain, paroxysmal nocturnal dyspnea, orthopnea, or arm or leg swelling  GASTROINTESTINAL: No abdominal  or epigastric pain, nausea, vomiting, hematemesis, diarrhea, constipation, melena or bright red blood.  GENITOURINARY: No dysuria, nocturia, hematuria, or urinary incontinence  NEUROLOGICAL: No headaches, memory loss, slurred speech, limb weakness, loss of strength, numbness, or tremors  SKIN: No itching, burning, rashes, or lesions   LYMPH NODES: No enlarged glands  ENDOCRINE: No heat or cold intolerance, or hair loss  MUSCULOSKELETAL: No joint pain or swelling, muscle, back, or extremity pain  PSYCHIATRIC: No depression, anxiety, or difficulty sleeping  HEME/LYMPH: No easy bruising or bleeding gums  ALLERY AND IMMUNOLOGIC: No hives or rash.      Vital Signs Last 24 Hrs  T(C): 36.6 (25 May 2023 05:40), Max: 37.1 (25 May 2023 00:11)  T(F): 97.88 (25 May 2023 05:40), Max: 98.7 (25 May 2023 00:11)  HR: 88 (25 May 2023 05:40) (82 - 103)  BP: 107/72 (25 May 2023 05:40) (105/79 - 110/68)  BP(mean): --  RR: 18 (25 May 2023 05:40) (17 - 20)  SpO2: 98% (25 May 2023 01:33) (98% - 99%)    Parameters below as of 25 May 2023 01:33  Patient On (Oxygen Delivery Method): room air        PHYSICAL EXAM:    GENERAL: In no apparent distress, well nourished, and hydrated.  HEAD:  Atraumatic, Normocephalic  EYES: EOMI, PERRLA, conjunctiva and sclera clear  ENMT: No tonsillar erythema, exudates, or enlargement; Moist mucous membranes, Good dentition, No lesions  NECK: Supple and normal thyroid.  No JVD or carotid bruit.  Carotid pulse is 2+ bilaterally.  HEART: Regular rate and rhythm; No murmurs, rubs, or gallops.  PULMONARY: Clear to auscultation and perfusion.  No rales, wheezing, or rhonchi bilaterally.  ABDOMEN: Soft, Nontender, Bowel sounds present  EXTREMITIES:  2+ Peripheral Pulses, No clubbing, cyanosis, or edema  LYMPH: No lymphadenopathy noted  NEUROLOGICAL: Grossly nonfocal          INTERPRETATION OF TELEMETRY:    ECG:    I&O's Detail      LABS:                        10.4   9.28  )-----------( 173      ( 25 May 2023 06:07 )             32.9         137  |  105  |  7   ----------------------------<  101<H>  3.7   |  19<L>  |  0.58    Ca    9.1      24 May 2023 18:37    TPro  6.8  /  Alb  3.7  /  TBili  0.1<L>  /  DBili  x   /  AST  20  /  ALT  37  /  AlkPhos  93            Urinalysis Basic - ( 24 May 2023 21:25 )    Color: Yellow / Appearance: Slightly Turbid / S.033 / pH: x  Gluc: x / Ketone: Negative  / Bili: Negative / Urobili: 2 mg/dL   Blood: x / Protein: 30 mg/dL / Nitrite: Negative   Leuk Esterase: Large / RBC: 2 /hpf /  /HPF   Sq Epi: x / Non Sq Epi: x / Bacteria: Moderate      BNP  I&O's Detail    Daily Height in cm: 167.64 (25 May 2023 01:33)    Daily Weight Pre-pregnancy in k (25 May 2023 01:33)    RADIOLOGY & ADDITIONAL STUDIES: Patient is a 27y old  Female who presents with a chief complaint of       PAST MEDICAL & SURGICAL HISTORY:  Anemia      GERD (gastroesophageal reflux disease)      Polycystic ovary      History of ovarian cystectomy      History of bilateral breast reduction surgery          HPI:  27 year old Female PMHx of  2 miscarriages and 1 ectopic pregnancy prior and  Mhx of GERD and Anemia who is  @30w3d (LAZ 23 by first trimester US)   presents with multiple syncopal episodes. Pt reports no prior history prior to pregnancy, presents recurrent syncopal episodes since 12 weeks of pregnancy.  SHe reports approx 12 episodes since 23. SHe reports a prodrome of  having palpitations, general fatigue, blurry vision and then vision going dark prior to passing out. There were also two episodes of no prodrome. She also has intermittent palpitations and light headedness without syncope. Per chart review report , she Saw cardiologist Dr Kye Ayala who placed a holter for 30 days from 4/3/23 to 23 which showed 2 degree type 1 at 71 bpm. She was symptomatic while on the monitor but did not correlate with the monitor findings . She passed out and fell down 14 stairs on 23. Went to [facility], neurology consulted for seizures. CT head/ spine, MRI brain, EEG all negative. EKG showed SR. Echo showed LVEF 60-65%. She saw Dr. Alves in  the office with the plan to place ILR; however, was seen by cardiologist Dr. Chahal who ultimately referred her to the ED for further evaluation. She reports no further syncope since 23.                 FINDINGS:  MEDICATIONS  (STANDING):  famotidine    Tablet 20 milliGRAM(s) Oral daily  heparin   Injectable 5000 Unit(s) SubCutaneous every 12 hours  prenatal multivitamin 1 Tablet(s) Oral daily    MEDICATIONS  (PRN):      FAMILY HISTORY:  noncontributory    SOCIAL HISTORY:    CIGARETTES:denies    ALCOHOL:occasional     No illicit drug use    REVIEW OF SYSTEMS:    CONSTITUTIONAL: No fever, weight loss, chills, shakes, or fatigue  EYES: No eye pain, visual disturbances, or discharge  ENMT:  No difficulty hearing, tinnitus, vertigo; No sinus or throat pain  NECK: No pain or stiffness  RESPIRATORY: No cough, wheezing, hemoptysis  CARDIOVASCULAR: No chest pain, paroxysmal nocturnal dyspnea, orthopnea, or arm or leg swelling  GASTROINTESTINAL: No abdominal  or epigastric pain, nausea, vomiting, hematemesis, diarrhea, constipation, melena or bright red blood.  GENITOURINARY: No dysuria, nocturia, hematuria, or urinary incontinence  NEUROLOGICAL: No headaches, memory loss, slurred speech, limb weakness, loss of strength, numbness, or tremors  SKIN: No itching, burning, rashes, or lesions   LYMPH NODES: No enlarged glands  ENDOCRINE: No heat or cold intolerance, or hair loss  MUSCULOSKELETAL: No joint pain or swelling, muscle, back, or extremity pain  PSYCHIATRIC: No depression, anxiety, or difficulty sleeping  HEME/LYMPH: No easy bruising or bleeding gums  ALLERY AND IMMUNOLOGIC: No hives or rash.      Vital Signs Last 24 Hrs  T(C): 36.6 (25 May 2023 05:40), Max: 37.1 (25 May 2023 00:11)  T(F): 97.88 (25 May 2023 05:40), Max: 98.7 (25 May 2023 00:11)  HR: 88 (25 May 2023 05:40) (82 - 103)  BP: 107/72 (25 May 2023 05:40) (105/79 - 110/68)  BP(mean): --  RR: 18 (25 May 2023 05:40) (17 - 20)  SpO2: 98% (25 May 2023 01:33) (98% - 99%)    Parameters below as of 25 May 2023 01:33  Patient On (Oxygen Delivery Method): room air        PHYSICAL EXAM:    GENERAL: In no apparent distress, well nourished, and hydrated.  HEAD:  Atraumatic, Normocephalic  EYES: EOMI, PERRLA, conjunctiva and sclera clear  ENMT: No tonsillar erythema, exudates, or enlargement; Moist mucous membranes, Good dentition, No lesions  NECK: Supple and normal thyroid.  No JVD or carotid bruit.  Carotid pulse is 2+ bilaterally.  HEART: Regular rate and rhythm; No murmurs, rubs, or gallops.  PULMONARY: Clear to auscultation and perfusion.  No rales, wheezing, or rhonchi bilaterally.  ABDOMEN: Soft, Nontender, Bowel sounds present  EXTREMITIES:  2+ Peripheral Pulses, No clubbing, cyanosis, or edema  LYMPH: No lymphadenopathy noted  NEUROLOGICAL: Grossly nonfocal          INTERPRETATION OF TELEMETRY:    ECG:    I&O's Detail      LABS:                        10.4   9.28  )-----------( 173      ( 25 May 2023 06:07 )             32.9         137  |  105  |  7   ----------------------------<  101<H>  3.7   |  19<L>  |  0.58    Ca    9.1      24 May 2023 18:37    TPro  6.8  /  Alb  3.7  /  TBili  0.1<L>  /  DBili  x   /  AST  20  /  ALT  37  /  AlkPhos  93            Urinalysis Basic - ( 24 May 2023 21:25 )    Color: Yellow / Appearance: Slightly Turbid / S.033 / pH: x  Gluc: x / Ketone: Negative  / Bili: Negative / Urobili: 2 mg/dL   Blood: x / Protein: 30 mg/dL / Nitrite: Negative   Leuk Esterase: Large / RBC: 2 /hpf /  /HPF   Sq Epi: x / Non Sq Epi: x / Bacteria: Moderate      BNP  I&O's Detail    Daily Height in cm: 167.64 (25 May 2023 01:33)    Daily Weight Pre-pregnancy in k (25 May 2023 01:33)    RADIOLOGY & ADDITIONAL STUDIES:

## 2023-05-25 NOTE — OB PROVIDER H&P - ATTENDING COMMENTS
ATTG on service note    Pt interviewed at the bedside in the ED.    Pt is a 26 yo  @ 30.3 wks with PNC by OB in [facility] was sent to ED by [facility] Private Cardiologist Dr Chahal for further cardiology w/u for h/o presyncope and syncope episodes since 12 wks.  Mot recent and significant episode on 5/8 when pt fell down the stairs.  Hospitalized at [facility] and had nl OB and Cardiology w/u.  Pt instructed to get cardiology f/u and started with Cardiologist at [facility] and seen today for the first time.  Pt with no OB complaints.  Uncomplicated PNC as per pt.    T(C): 36.9 (05-25-23 @ 01:33), Max: 37.1 (05-25-23 @ 00:11)  HR: 82 (05-25-23 @ 01:33) (82 - 103)  BP: 110/68 (05-25-23 @ 01:33) (105/79 - 110/68)  RR: 18 (05-25-23 @ 01:33) (17 - 20)  SpO2: 98% (05-25-23 @ 01:33) (98% - 99%)    VE-deferred  FHT-baseline 145, moderate variability, no decels, +accels  toco-none  EFW-1000 gms (clinical)    Labs   CBC-9.3/11.1/35.1/185  Prenatal labs - pending    a/p27 yo  @ 30.3 wks with h/o syncope episodes during this pregnancy now admitted as per private Cardiologist for w/u for etiology.  No OB complaints.  Reactive NST for gestational age.  Pt stable from OB standpoint.  -admit to ANTE - for tele monitoring as per cardiology recs  -mngt and w/u as per Cardiology team - Neuro consult and consider Cardio OB input as well  -MFM input in AM  -SCD for DVT ppx  -prenatal labs ro be sent    HARI Berger

## 2023-05-25 NOTE — CONSULT NOTE ADULT - ASSESSMENT
*** Incomplete note 27 year old Female PMHx of  2 miscarriages and 1 ectopic pregnancy prior and  Mhx of GERD and Anemia who is  @30w3d (LAZ 23 by first trimester US)   presents with multiple syncopal episodes    #Syncope  -Approx 12 episodes since 23. Most episodes with prodrome of palpitations, general fatigue, blurry vision and vison going dark before passing out. Two episodes fo no prodrome, including one where she fell down a  flight of stairs.   -Her last syncopal episode was 23 during which she fell down a flight of stairs and went to [facility] with report of normal TTE, MRI, and CTH, she also had EEG which she thinks was wnl, but not sure.   -No family history of SCD, or syncope prior to pregnancy  -She had holter 4/3/23 to 23 which showed 2 degree type 1 at 71 bpm.  -EKG sinus rhythm with normal axis, LAE, Downsloping ST with TWI V1-V3.   -Please check TTE  -Please check Liset Bipolar precordial leads to search for Epsilon waves.   -Please check Lyme AB, TSH/FT4, A1c, Lipids    Chapo Abdi, Cardiology Fellow, F-1    For all New Consults and Questions:  [email or web address]   Login: [email or web address]    *** Note not final until signed by attending   27 year old Female PMHx of  2 miscarriages and 1 ectopic pregnancy prior and  Mhx of GERD and Anemia who is  @30w3d (LAZ 23 by first trimester US)   presents with multiple syncopal episodes    #Syncope  -Approx 12 episodes since 23. Most episodes with prodrome of palpitations, general fatigue, blurry vision and vison going dark before passing out. Two episodes fo no prodrome, including one where she fell down a  flight of stairs.   -Her last syncopal episode was 23 during which she fell down a flight of stairs and went to [facility] with report of normal TTE, MRI, and CTH, she also had EEG which she thinks was wnl, but not sure.   -No family history of SCD, or syncope prior to pregnancy  -She had holter 4/3/23 to 23 which showed 2 degree type 1 at 71 bpm.  -EKG sinus rhythm with normal axis, LAE, Downsloping ST with TWI V1-V3.   -Tele: sinus 's (including one episode of palpitations and dizziness this morning)  -Please check TTE  -Please check Liset Bipolar precordial leads to search for Epsilon waves.   -Please check Lyme AB, TSH/FT4, A1c, Lipids  -Appreciate Neurology consult    Chapo Abdi, Cardiology Fellow, F-1    For all New Consults and Questions:  [email or web address]   Login: [email or web address]    *** Note not final until signed by attending   27 year old Female PMHx of  2 miscarriages and 1 ectopic pregnancy prior and  Mhx of GERD and Anemia who is  @30w3d (LAZ 23 by first trimester US)   presents with multiple syncopal episodes    #Syncope  -Approx 12 episodes since 23. Most episodes with prodrome of palpitations, general fatigue, blurry vision and vison going dark before passing out. Two episodes fo no prodrome, including one where she fell down a  flight of stairs.   -Her last syncopal episode was 23 during which she fell down a flight of stairs and went to [facility] with report of normal TTE, MRI, and CTH, she also had EEG which she thinks was wnl, but not sure.   -No family history of SCD or syncope, no personal hx  of syncope prior to pregnancy  -She had holter 4/3/23 to 23 which showed 2 degree type 1 at 71 bpm.  -EKG sinus rhythm with normal axis, LAE, Downsloping ST with TWI V1-V3.   - Liset Bipolar precordial leads did not detect Epsilon waves.   -Tele: sinus 's (including one episode of palpitations and dizziness this morning)  -Keep on tele  - TTE prelim read grossly wnl. f/u final read.   -Signal Average EKG to be performed, if wnl, will plan for ILR  -Please check Lyme AB, TSH/FT4, A1c, Lipids  -Appreciate Neurology consult    Chapo Abdi, Cardiology Fellow, F-1    For all New Consults and Questions:  [email or web address]   Login: [email or web address]    *** Note not final until signed by attending

## 2023-05-25 NOTE — PROGRESS NOTE ADULT - SUBJECTIVE AND OBJECTIVE BOX
R3 Antepartum Note, HD#2    Interval events: Patient seen and examined at bedside, no acute overnight events. No acute complaints. Pt reports +FM, denies LOF, VB, ctx, HA, epigastric pain, blurred vision, CP, SOB, N/V, fevers, and chills.    Vital Signs Last 24 Hours  T(C): 36.6 (05-25-23 @ 05:40), Max: 37.1 (05-25-23 @ 00:11)  HR: 88 (05-25-23 @ 05:40) (82 - 103)  BP: 107/72 (05-25-23 @ 05:40) (105/79 - 110/68)  RR: 18 (05-25-23 @ 05:40) (17 - 20)  SpO2: 98% (05-25-23 @ 01:33) (98% - 99%)    Physical Exam:  General: NAD  Abdomen: Soft, non-tender, gravid  Ext: No pain or swelling    NST reactive overnight    Labs:             10.4   9.28  )-----------( 173      ( 05-25 @ 06:07 )             32.9     05-24 @ 18:37    137  |  105  |  7   ----------------------------<  101  3.7   |  19  |  0.58    Ca    9.1      05-24 @ 18:37    TPro  6.8  /  Alb  3.7  /  TBili  0.1  /  DBili  x   /  AST  20  /  ALT  37  /  AlkPhos  93  05-24 @ 18:37            MEDICATIONS  (STANDING):  famotidine    Tablet 20 milliGRAM(s) Oral daily  heparin   Injectable 5000 Unit(s) SubCutaneous every 12 hours  prenatal multivitamin 1 Tablet(s) Oral daily    MEDICATIONS  (PRN):

## 2023-05-25 NOTE — OB RN PATIENT PROFILE - FALL HARM RISK - HARM RISK INTERVENTIONS

## 2023-05-25 NOTE — PROGRESS NOTE ADULT - SUBJECTIVE AND OBJECTIVE BOX
DATE OF SERVICE: 05-25-23 @ 10:36    Patient is a 27y old  Female who presents with a chief complaint of     INTERVAL HISTORY: Feels ok.     REVIEW OF SYSTEMS:  CONSTITUTIONAL: No weakness  EYES/ENT: No visual changes;  No throat pain   NECK: No pain or stiffness  RESPIRATORY: No cough, wheezing; No shortness of breath  CARDIOVASCULAR: No chest pain or palpitations  GASTROINTESTINAL: No abdominal  pain. No nausea, vomiting, or hematemesis  GENITOURINARY: No dysuria, frequency or hematuria  NEUROLOGICAL: No stroke like symptoms  SKIN: No rashes    TELEMETRY Personally reviewed: SR   	  MEDICATIONS:        PHYSICAL EXAM:  T(C): 36.6 (05-25-23 @ 05:40), Max: 37.1 (05-25-23 @ 00:11)  HR: 88 (05-25-23 @ 05:40) (82 - 103)  BP: 107/72 (05-25-23 @ 05:40) (105/79 - 110/68)  RR: 18 (05-25-23 @ 05:40) (17 - 20)  SpO2: 98% (05-25-23 @ 01:33) (98% - 99%)  Wt(kg): --  I&O's Summary    Height (cm): 167.6 (05-25 @ 01:33)  Weight (kg): 88.9 (05-25 @ 01:33)  BMI (kg/m2): 31.6 (05-25 @ 01:33)  BSA (m2): 1.98 (05-25 @ 01:33)    Appearance: In no distress	  HEENT:    PERRL, EOMI	  Cardiovascular:  S1 S2, No JVD  Respiratory: Lungs clear to auscultation	  Gastrointestinal:  Soft, Non-tender, + BS	  Vascularature:  No edema of LE  Psychiatric: Appropriate affect   Neuro: no acute focal deficits                               10.4   9.28  )-----------( 173      ( 25 May 2023 06:07 )             32.9     05-24    137  |  105  |  7   ----------------------------<  101<H>  3.7   |  19<L>  |  0.58    Ca    9.1      24 May 2023 18:37    TPro  6.8  /  Alb  3.7  /  TBili  0.1<L>  /  DBili  x   /  AST  20  /  ALT  37  /  AlkPhos  93  05-24        Labs personally reviewed      ASSESSMENT/PLAN: 	    Ms. Sosa is a 27 year old female with no significant PMH who is currently 30 weeks pregnant and presents with multiple episodes of syncope and near syncope since 12 weeks gestations. Of note, patient is high risk for she has had 2 early miscarriages and an ectopic pregnancy. She states that the syncopal episodes are usually preceded by palpitations, chest pressure, ringing in the ears, weakness in lower extremity and visual changes. The most recent syncopal episode on 5/8 was without promodal symtoms and was traumatic (patient fell down a flight of stairs). She was evaluated at [facility] where and TTE was done and discharged for OP cardiology follow up. She has just completed holter monitoring. She also reports NIELSON and mild LE edema but unsure if it is 2/2 pregnancy. Was sent in by Dr. Chahal following his initial evaluation for further workup. Patient currently denies CP, palpitations, orthopnea or pND.     1. Syncope  - Multiple syncopal episodes with most recent episode with trauma and no prodrome concerning with possible conduction or structural defects  - Some syncopal episodes a/b chest pressure and palpitations  - ECG NSR twi III, V2, V3  - Will obtain and review Holter results  - TTE performed, results pending  - Appreciate EP consult and recommendations  - Monitor on tele  - CBC/CMP wnl  - Appreciate Neuro eval: 24 hour EEG pending    2. High Risk Pregnancy  - Appreciate Ob/Gyn recommendations for monitoring of mother/baby          KAVITHA Luna DO [facility]  Cardiovascular Medicine  [address] [place] [address], [address]  Available through call or text on [employer] TE[employer]  Office: [phone]   DATE OF SERVICE: 05-25-23 @ 10:36    Patient is a 27y old  Female who presents with a chief complaint of     INTERVAL HISTORY: Feels ok.     REVIEW OF SYSTEMS:  CONSTITUTIONAL: No weakness  EYES/ENT: No visual changes;  No throat pain   NECK: No pain or stiffness  RESPIRATORY: No cough, wheezing; No shortness of breath  CARDIOVASCULAR: No chest pain or palpitations  GASTROINTESTINAL: No abdominal  pain. No nausea, vomiting, or hematemesis  GENITOURINARY: No dysuria, frequency or hematuria  NEUROLOGICAL: No stroke like symptoms  SKIN: No rashes    TELEMETRY Personally reviewed: SR   	  MEDICATIONS:        PHYSICAL EXAM:  T(C): 36.6 (05-25-23 @ 05:40), Max: 37.1 (05-25-23 @ 00:11)  HR: 88 (05-25-23 @ 05:40) (82 - 103)  BP: 107/72 (05-25-23 @ 05:40) (105/79 - 110/68)  RR: 18 (05-25-23 @ 05:40) (17 - 20)  SpO2: 98% (05-25-23 @ 01:33) (98% - 99%)  Wt(kg): --  I&O's Summary    Height (cm): 167.6 (05-25 @ 01:33)  Weight (kg): 88.9 (05-25 @ 01:33)  BMI (kg/m2): 31.6 (05-25 @ 01:33)  BSA (m2): 1.98 (05-25 @ 01:33)    Appearance: In no distress	  HEENT:    PERRL, EOMI	  Cardiovascular:  S1 S2, No JVD  Respiratory: Lungs clear to auscultation	  Gastrointestinal:  Soft, Non-tender, + BS	  Vascularature:  No edema of LE  Psychiatric: Appropriate affect   Neuro: no acute focal deficits                               10.4   9.28  )-----------( 173      ( 25 May 2023 06:07 )             32.9     05-24    137  |  105  |  7   ----------------------------<  101<H>  3.7   |  19<L>  |  0.58    Ca    9.1      24 May 2023 18:37    TPro  6.8  /  Alb  3.7  /  TBili  0.1<L>  /  DBili  x   /  AST  20  /  ALT  37  /  AlkPhos  93  05-24        Labs personally reviewed      ASSESSMENT/PLAN: 	    Ms. Sosa is a 27 year old female with no significant PMH who is currently 30 weeks pregnant and presents with multiple episodes of syncope and near syncope since 12 weeks gestations. Of note, patient is high risk for she has had 2 early miscarriages and an ectopic pregnancy. She states that the syncopal episodes are usually preceded by palpitations, chest pressure, ringing in the ears, weakness in lower extremity and visual changes. The most recent syncopal episode on 5/8 was without promodal symtoms and was traumatic (patient fell down a flight of stairs). She was evaluated at [facility] where and TTE was done and discharged for OP cardiology follow up. She has just completed holter monitoring. She also reports NIELSON and mild LE edema but unsure if it is 2/2 pregnancy. Was sent in by Dr. Chahal following his initial evaluation for further workup. Patient currently denies CP, palpitations, orthopnea or pND.     1. Syncope  - Multiple syncopal episodes with most recent episode with trauma and no prodrome concerning with possible conduction or structural defects  - Some syncopal episodes a/b chest pressure and palpitations  - ECG NSR twi III, V2, V3  - TTE performed, results pending  - Appreciate EP consult and recommendations  - Monitor on tele  - CBC/CMP wnl  - Appreciate Neuro eval: 24 hour EEG pending    2. High Risk Pregnancy  - Appreciate Ob/Gyn recommendations for monitoring of mother/baby          DARRYL Luna-HOWARD Pereyra DO [facility]  Cardiovascular Medicine  [address], [address]  Available through call or text on [employer] TEAM[employer]  Office: [phone]

## 2023-05-25 NOTE — OB PROVIDER H&P - HISTORY OF PRESENT ILLNESS
OZZY ASH  27y  Female [id number]    HPI:  26 yo  @30w3d (LAZ 23 by first trimester US) presenting from the cardiologist's office for cardiology workup. Patient has had multiple syncopal and pre-syncopal events during the pregnancy since approximately 12wga. Most recently she had a syncopal episode on  where she fell down a flight of stairs. She was taken to [facility] where she had a full obstetric workup as well as a cardiology workup including an echo. She was discharged for outpatient follow up. States that she presented to the Cardiologist Dr. Chahal for the first time today and he wanted her to present to [facility] ED for inpatient cardiology workup.     Denies obstetric complaints today. Denies vaginal bleeding, abnormal vaginal discharge, denies cramping. Endorses good fetal movement.     PN issues: uncomplicated pregnancy, hyperemesis in the first trimester     Name of GYN Physician:  Dr. Markus Earl - [facility]   Cardiologist: Dr. Chahal     Past OB:    SAB x2 (, ) no D&C  Ectopic pregnancy (2018) treated with methotrexate - pt believes it was on the right side     Past gyn: Hx PCOS. Denies fibroids, cysts, endometriosis, STI's, Abnormal pap smears     Home meds: [facility] Meds:   MEDICATIONS  (STANDING):  heparin   Injectable 5000 Unit(s) SubCutaneous every 12 hours  prenatal multivitamin 1 Tablet(s) Oral daily    Allergies    Tomatoes (Hives)  methylPREDNISolone (Hives)  latex (Rash)      PAST MEDICAL & SURGICAL HISTORY:  - Anemia  - GERD (gastroesophageal reflux disease)  - LSC L ovarian cystectomy ()  - Bilateral breast reduction surgery    Social History:  Denies smoking use, drug use, alcohol use.    Vital Signs Last 24 Hrs  T(C): 37.1 (25 May 2023 00:11), Max: 37.1 (25 May 2023 00:11)  T(F): 98.7 (25 May 2023 00:11), Max: 98.7 (25 May 2023 00:11)  HR: 84 (25 May 2023 00:11) (84 - 103)  BP: 109/73 (25 May 2023 00:11) (105/79 - 109/73)  BP(mean): --  RR: 18 (25 May 2023 00:11) (17 - 20)  SpO2: 99% (25 May 2023 00:11) (98% - 99%)    Parameters below as of 25 May 2023 00:11  Patient On (Oxygen Delivery Method): room air  O2 Flow (L/min): 19      Physical Exam:   General: sitting comfortably in bed, NAD   Abd: Soft, non-tender, non-distended. Gravid.    :  No bleeding on pad.      Ext: non-tender b/l, no edema     LABS:                              11.1   9.38  )-----------( 185      ( 24 May 2023 18:37 )             35.1     -    137  |  105  |  7   ----------------------------<  101<H>  3.7   |  19<L>  |  0.58    Ca    9.1      24 May 2023 18:37    TPro  6.8  /  Alb  3.7  /  TBili  0.1<L>  /  DBili  x   /  AST  20  /  ALT  37  /  AlkPhos  93  05-24    I&O's Detail      Urinalysis Basic - ( 24 May 2023 21:25 )    Color: Yellow / Appearance: Slightly Turbid / S.033 / pH: x  Gluc: x / Ketone: Negative  / Bili: Negative / Urobili: 2 mg/dL   Blood: x / Protein: 30 mg/dL / Nitrite: Negative   Leuk Esterase: Large / RBC: 2 /hpf /  /HPF   Sq Epi: x / Non Sq Epi: x / Bacteria: Moderate

## 2023-05-25 NOTE — OB PROVIDER H&P - ASSESSMENT
26 yo  @30w3d (LAZ 23 by first trimester US) presenting from the cardiologist's office for cardiology workup. History of syncopal and pre-syncopal episodes this pregnancy since 12wga. Pregnancy otherwise uncomplicated.     - Admit to OB Dr. Hammond   - TTE/tele ordered per cards   - Cards to follow   - Neuro consult in AM (Cardiology to call)  - Regular diet  - PNV, pepcid home meds ordered   - NST BID    Rukhsana Mane, PGY2  Patient seen with Dr. Berger and discussed with MFM Fellow Marcia

## 2023-05-25 NOTE — OB PROVIDER H&P - NSLOWPPHRISK_OBGYN_A_OB
No previous uterine incision/Gibson Pregnancy/Less than or equal to 4 previous vaginal births/No known bleeding disorder/No history of postpartum hemorrhage/No other PPH risks indicated

## 2023-05-25 NOTE — CONSULT NOTE ADULT - SUBJECTIVE AND OBJECTIVE BOX
Neurology - Consult Note    -  Spectra: [phone] ([facility]), [phone] ([facility])  -    HPI: Patient OZZY ASH is a 27y (1995) wo/man with a PMHx significant for ***      Review of Systems:  INCOMPLETE   CONSTITUTIONAL: No fevers or chills  EYES AND ENT: No visual changes or no throat pain   NECK: No pain or stiffness  RESPIRATORY: No hemoptysis or shortness of breath  CARDIOVASCULAR: No chest pain or palpitations  GASTROINTESTINAL: No melena or hematochezia  GENITOURINARY: No dysuria or hematuria  NEUROLOGICAL: +As stated in HPI above  SKIN: No itching, burning, rashes, or lesions   All other review of systems is negative unless indicated above.    Allergies:  Tomatoes (Hives)  methylPREDNISolone (Hives)  latex (Rash)      PMHx/PSHx/Family Hx: As above, otherwise see below   Anemia    GERD (gastroesophageal reflux disease)    Polycystic ovary        Social Hx:  No current use of tobacco, alcohol, or illicit drugs  Lives with ***    Medications:  MEDICATIONS  (STANDING):  famotidine    Tablet 20 milliGRAM(s) Oral daily  heparin   Injectable 5000 Unit(s) SubCutaneous every 12 hours  prenatal multivitamin 1 Tablet(s) Oral daily    MEDICATIONS  (PRN):      Vitals:  T(C): 36.6 (05-25-23 @ 05:40), Max: 37.1 (05-25-23 @ 00:11)  HR: 88 (05-25-23 @ 05:40) (82 - 103)  BP: 107/72 (05-25-23 @ 05:40) (105/79 - 110/68)  RR: 18 (05-25-23 @ 05:40) (17 - 20)  SpO2: 98% (05-25-23 @ 01:33) (98% - 99%)    Physical Examination: INCOMPLETE  General - NAD  Cardiovascular - Peripheral pulses palpable, no edema  Eyes - Fundoscopy with flat, sharp optic discs and no hemorrhage or exudates; Fundoscopy not well visualized; Fundoscopy not performed due to safety precautions in the setting of the COVID-19 pandemic    Neurologic Exam:  Mental status - Awake, Alert, Oriented to person, place, and time. Speech fluent, repetition and naming intact. Follows simple and complex commands. Attention/concentration, recent and remote memory (including registration and recall), and fund of knowledge intact    Cranial nerves - PERRLA, VFF, EOMI, face sensation (V1-V3) intact b/l, facial strength intact without asymmetry b/l, hearing intact b/l, palate with symmetric elevation, trapezius OR sternocleidomastiod 5/5 strength b/l, tongue midline on protrusion with full lateral movement    Motor - Normal bulk and tone throughout. No pronator drift.  Strength testing            Deltoid      Biceps      Triceps     Wrist Extension    Wrist Flexion     Interossei         R            5                 5               5                     5                              5                        5                 5  L             5                 5               5                     5                              5                        5                 5              Hip Flexion    Hip Extension    Knee Flexion    Knee Extension    Dorsiflexion    Plantar Flexion  R              5                           5                       5                           5                            5                          5  L              5                           5                        5                           5                            5                          5    Sensation - Light touch/temperature OR pain/vibration intact throughout    DTR's -             Biceps      Triceps     Brachioradialis      Patellar    Ankle    Toes/plantar response  R             2+             2+                  2+                       2+            2+                 Down  L              2+             2+                 2+                        2+           2+                 Down    Coordination - Finger to Nose intact b/l. No tremors appreciated    Gait and station - Normal casual gait. Romberg (-)    Labs:                        10.4   9.28  )-----------( 173      ( 25 May 2023 06:07 )             32.9     05-24    137  |  105  |  7   ----------------------------<  101<H>  3.7   |  19<L>  |  0.58    Ca    9.1      24 May 2023 18:37    TPro  6.8  /  Alb  3.7  /  TBili  0.1<L>  /  DBili  x   /  AST  20  /  ALT  37  /  AlkPhos  93  05-24    CAPILLARY BLOOD GLUCOSE        LIVER FUNCTIONS - ( 24 May 2023 18:37 )  Alb: 3.7 g/dL / Pro: 6.8 g/dL / ALK PHOS: 93 U/L / ALT: 37 U/L / AST: 20 U/L / GGT: x               CSF:                  Radiology:     Neurology - Consult Note    -  Spectra: [phone] ([facility]), [phone] ([facility]  -    HPI: Patient OZZY ASH is a 27y (1995) woman with no significant PMH presenting with recurrent episodes of syncope. Patient reports first episode occurred in January when she was 12 weeks pregnant. She reports total 12 episodes of syncope since January and most recent episodes on May 8th. She reports prior to syncopal event she would experience SOB, palpitations and blurry vision. However, last 2 syncopal events have occurred without warning. Once she was eating breakfast in bed and the next thing she remembers waking up in bed. She realized she passed out, but had not warning. She denies feeling confused after this episode. tongue biting or incontinence. The next time she passed out was on May 8th. She      Review of Systems:    All other review of systems is negative unless indicated above.    Allergies:  Tomatoes (Hives)  methylPREDNISolone (Hives)  latex (Rash)      PMHx/PSHx/Family Hx: As above, otherwise see below   Anemia    GERD (gastroesophageal reflux disease)    Polycystic ovary        Social Hx:  No current use of tobacco, alcohol, or illicit drugs  Lives with ***    Medications:  MEDICATIONS  (STANDING):  famotidine    Tablet 20 milliGRAM(s) Oral daily  heparin   Injectable 5000 Unit(s) SubCutaneous every 12 hours  prenatal multivitamin 1 Tablet(s) Oral daily    MEDICATIONS  (PRN):      Vitals:  T(C): 36.6 (05-25-23 @ 05:40), Max: 37.1 (05-25-23 @ 00:11)  HR: 88 (05-25-23 @ 05:40) (82 - 103)  BP: 107/72 (05-25-23 @ 05:40) (105/79 - 110/68)  RR: 18 (05-25-23 @ 05:40) (17 - 20)  SpO2: 98% (05-25-23 @ 01:33) (98% - 99%)    Physical Examination: INCOMPLETE  General - NAD  Cardiovascular - Peripheral pulses palpable, no edema  Eyes - Fundoscopy with flat, sharp optic discs and no hemorrhage or exudates; Fundoscopy not well visualized; Fundoscopy not performed due to safety precautions in the setting of the COVID-19 pandemic    Neurologic Exam:  Mental status - Awake, Alert, Oriented to person, place, and time. Speech fluent, repetition and naming intact. Follows simple and complex commands. Attention/concentration, recent and remote memory (including registration and recall), and fund of knowledge intact    Cranial nerves - PERRLA, VFF, EOMI, face sensation (V1-V3) intact b/l, facial strength intact without asymmetry b/l, hearing intact b/l, palate with symmetric elevation, trapezius OR sternocleidomastiod 5/5 strength b/l, tongue midline on protrusion with full lateral movement    Motor - Normal bulk and tone throughout. No pronator drift.  Strength testing            Deltoid      Biceps      Triceps     Wrist Extension    Wrist Flexion     Interossei         R            5                 5               5                     5                              5                        5                 5  L             5                 5               5                     5                              5                        5                 5              Hip Flexion    Hip Extension    Knee Flexion    Knee Extension    Dorsiflexion    Plantar Flexion  R              5                           5                       5                           5                            5                          5  L              5                           5                        5                           5                            5                          5    Sensation - Light touch/temperature OR pain/vibration intact throughout    DTR's -             Biceps      Triceps     Brachioradialis      Patellar    Ankle    Toes/plantar response  R             2+             2+                  2+                       2+            2+                 Down  L              2+             2+                 2+                        2+           2+                 Down    Coordination - Finger to Nose intact b/l. No tremors appreciated    Gait and station - Normal casual gait. Romberg (-)    Labs:                        10.4   9.28  )-----------( 173      ( 25 May 2023 06:07 )             32.9     05-24    137  |  105  |  7   ----------------------------<  101<H>  3.7   |  19<L>  |  0.58    Ca    9.1      24 May 2023 18:37    TPro  6.8  /  Alb  3.7  /  TBili  0.1<L>  /  DBili  x   /  AST  20  /  ALT  37  /  AlkPhos  93  05-24    CAPILLARY BLOOD GLUCOSE        LIVER FUNCTIONS - ( 24 May 2023 18:37 )  Alb: 3.7 g/dL / Pro: 6.8 g/dL / ALK PHOS: 93 U/L / ALT: 37 U/L / AST: 20 U/L / GGT: x               CSF:                  Radiology:     Neurology - Consult Note    -  Spectra: [phone] ([facility]), [phone] ([facility])  -    HPI: Patient OZZY ASH is a 27y (1995) woman with no significant PMH presenting with recurrent episodes of syncope. Patient reports first episode occurred in January when she was 12 weeks pregnant. She reports total 12 episodes of syncope since January and most recent episodes on May 8th. She reports prior to syncopal event she would experience SOB, palpitations and blurry vision. However, last 2 syncopal events have occurred without warning. Once she was eating breakfast in bed and the next thing she remembers waking up in bed. She realized she passed out, but had not warning. She denies feeling confused after this episode, tongue biting or incontinence. The next time she passed out was on May 8th. She was walking up to her apartment and all of a sudden was being woken up by bystander. She had fallen down 30 steps. She reports confusion after wards      Review of Systems:    All other review of systems is negative unless indicated above.    Allergies:  Tomatoes (Hives)  methylPREDNISolone (Hives)  latex (Rash)      PMHx/PSHx/Family Hx: As above, otherwise see below   Anemia    GERD (gastroesophageal reflux disease)    Polycystic ovary        Social Hx:  No current use of tobacco, alcohol, or illicit drugs  Lives with ***    Medications:  MEDICATIONS  (STANDING):  famotidine    Tablet 20 milliGRAM(s) Oral daily  heparin   Injectable 5000 Unit(s) SubCutaneous every 12 hours  prenatal multivitamin 1 Tablet(s) Oral daily    MEDICATIONS  (PRN):      Vitals:  T(C): 36.6 (05-25-23 @ 05:40), Max: 37.1 (05-25-23 @ 00:11)  HR: 88 (05-25-23 @ 05:40) (82 - 103)  BP: 107/72 (05-25-23 @ 05:40) (105/79 - 110/68)  RR: 18 (05-25-23 @ 05:40) (17 - 20)  SpO2: 98% (05-25-23 @ 01:33) (98% - 99%)    Physical Examination: INCOMPLETE  General - NAD  Cardiovascular - Peripheral pulses palpable, no edema  Eyes - Fundoscopy with flat, sharp optic discs and no hemorrhage or exudates; Fundoscopy not well visualized; Fundoscopy not performed due to safety precautions in the setting of the COVID-19 pandemic    Neurologic Exam:  Mental status - Awake, Alert, Oriented to person, place, and time. Speech fluent, repetition and naming intact. Follows simple and complex commands. Attention/concentration, recent and remote memory (including registration and recall), and fund of knowledge intact    Cranial nerves - PERRLA, VFF, EOMI, face sensation (V1-V3) intact b/l, facial strength intact without asymmetry b/l, hearing intact b/l, palate with symmetric elevation, trapezius OR sternocleidomastiod 5/5 strength b/l, tongue midline on protrusion with full lateral movement    Motor - Normal bulk and tone throughout. No pronator drift.  Strength testing            Deltoid      Biceps      Triceps     Wrist Extension    Wrist Flexion     Interossei         R            5                 5               5                     5                              5                        5                 5  L             5                 5               5                     5                              5                        5                 5              Hip Flexion    Hip Extension    Knee Flexion    Knee Extension    Dorsiflexion    Plantar Flexion  R              5                           5                       5                           5                            5                          5  L              5                           5                        5                           5                            5                          5    Sensation - Light touch/temperature OR pain/vibration intact throughout    DTR's -             Biceps      Triceps     Brachioradialis      Patellar    Ankle    Toes/plantar response  R             2+             2+                  2+                       2+            2+                 Down  L              2+             2+                 2+                        2+           2+                 Down    Coordination - Finger to Nose intact b/l. No tremors appreciated    Gait and station - Normal casual gait. Romberg (-)    Labs:                        10.4   9.28  )-----------( 173      ( 25 May 2023 06:07 )             32.9     05-24    137  |  105  |  7   ----------------------------<  101<H>  3.7   |  19<L>  |  0.58    Ca    9.1      24 May 2023 18:37    TPro  6.8  /  Alb  3.7  /  TBili  0.1<L>  /  DBili  x   /  AST  20  /  ALT  37  /  AlkPhos  93  05-24    CAPILLARY BLOOD GLUCOSE        LIVER FUNCTIONS - ( 24 May 2023 18:37 )  Alb: 3.7 g/dL / Pro: 6.8 g/dL / ALK PHOS: 93 U/L / ALT: 37 U/L / AST: 20 U/L / GGT: x               CSF:                  Radiology:     Neurology - Consult Note    -  Spectra: [phone] ([facility]), [phone] ([facility])  -    HPI: Patient OZZY ASH is a 27y (1995) woman with no significant PMH who is 30 weeks pregnant presenting with recurrent episodes of syncope. Patient reports first episode occurred in January when she was 12 weeks pregnant. She reports total 12 episodes of syncope since January and most recent episodes on May 8th. She reports prior to syncopal event she would experience SOB, palpitations and blurry vision. However, last 2 syncopal events have occurred without warning. She went to cardiologist and had holter monitor placed with and had echo done, which were reportedly normal. Once she was eating breakfast in bed and the next thing she remembers waking up in bed. She realized she passed out, but had not warning. She denies feeling confused after this episode, tongue biting or incontinence. The next time she passed out was on May 8th. She was walking up to her apartment and all of a sudden was being woken up by bystander. She had fallen down 30 steps. She reports confusion after wards. She didn't remember that she was pregnant and wasn't aware she fell. She hit her head and was bleeding. She also bit the left posterior lateral tongue. Denies previous history of head trauma, febrile seizures, weakness, numbness, headache, dizziness.       Review of Systems:    All other review of systems is negative unless indicated above.    Allergies:  Tomatoes (Hives)  methylPREDNISolone (Hives)  latex (Rash)      PMHx/PSHx/Family Hx: As above, otherwise see below   Anemia    Social Hx:  No current use of tobacco, alcohol, or illicit drugs      Medications:  MEDICATIONS  (STANDING):  famotidine    Tablet 20 milliGRAM(s) Oral daily  heparin   Injectable 5000 Unit(s) SubCutaneous every 12 hours  prenatal multivitamin 1 Tablet(s) Oral daily    MEDICATIONS  (PRN):      Vitals:  T(C): 36.6 (05-25-23 @ 05:40), Max: 37.1 (05-25-23 @ 00:11)  HR: 88 (05-25-23 @ 05:40) (82 - 103)  BP: 107/72 (05-25-23 @ 05:40) (105/79 - 110/68)  RR: 18 (05-25-23 @ 05:40) (17 - 20)  SpO2: 98% (05-25-23 @ 01:33) (98% - 99%)    Physical Examination:   General - NAD  Eyes -  Fundoscopy not performed due to safety precautions in the setting of the COVID-19 pandemic    Neurologic Exam:  Mental status - Awake, Alert, Oriented to person, place, and time. Speech fluent, repetition and naming intact. Follows simple and complex commands.     Cranial nerves - PERRL, VFF, EOMI, face sensation (V1-V3) intact b/l, facial strength intact without asymmetry b/l, hearing intact b/l, palate with symmetric elevation,  sternocleidomastiod 5/5 strength b/l, tongue midline on protrusion with full lateral movement    Motor - Normal bulk and tone throughout. No pronator drift.  Strength testing            Deltoid      Biceps      Triceps     Wrist Extension    Wrist Flexion                  R            5                 5               5                     5                              5                        5                   L             5                 5               5                     5                              5                        5                              Hip Flexion    Hip Extension    Knee Flexion    Knee Extension    Dorsiflexion    Plantar Flexion  R              5                           5                       5                           5                            5                          5  L              5                           5                        5                           5                            5                          5    Sensation - Light touch intact throughout    DTR's -             Biceps      Triceps     Brachioradialis         Patellar    Ankle    Toes/plantar response  R             2+             2+                  2+                       2+            2+                 Down  L              2+             2+                 2+                        2+           2+                 Down    Coordination - Finger to Nose intact b/l. No tremors appreciated    Gait and station Deferred due to fall/safety risk     Labs:                        10.4   9.28  )-----------( 173      ( 25 May 2023 06:07 )             32.9     05-24    137  |  105  |  7   ----------------------------<  101<H>  3.7   |  19<L>  |  0.58    Ca    9.1      24 May 2023 18:37    TPro  6.8  /  Alb  3.7  /  TBili  0.1<L>  /  DBili  x   /  AST  20  /  ALT  37  /  AlkPhos  93  05-24    CAPILLARY BLOOD GLUCOSE        LIVER FUNCTIONS - ( 24 May 2023 18:37 )  Alb: 3.7 g/dL / Pro: 6.8 g/dL / ALK PHOS: 93 U/L / ALT: 37 U/L / AST: 20 U/L / GGT: x              Neurology - Consult Note    -  Spectra: [phone] ([facility]), [phone] ([facility])  -    HPI: Patient OZZY ASH is a 27y (1995) woman with Anemia and who is 30 weeks pregnant presenting with recurrent episodes of syncope. Patient reports first episode occurred in January. She reports total 12 episodes of syncope since January and most recent episodes on May 8th. She reports prior to syncopal event she would experience SOB, palpitations and blurry vision. However, last 2 syncopal events have occurred without warning. She went to cardiologist and had holter monitor placed with and had echo done, which were reportedly normal. Once she was eating breakfast in bed and the next thing she remembers waking up in bed. She realized she passed out, but had not warning. She denies feeling confused after this episode, tongue biting or incontinence. The next time she passed out was on May 8th. She was walking up to her apartment and all of a sudden was being woken up by bystander. She had fallen down 30 steps. She reports confusion after wards. She didn't remember that she was pregnant and wasn't aware she fell. She hit her head and was bleeding. She also bit the left posterior lateral tongue. Denies previous history of head trauma, febrile seizures, weakness, numbness, headache, dizziness.       Review of Systems:    All other review of systems is negative unless indicated above.    Allergies:  Tomatoes (Hives)  methylPREDNISolone (Hives)  latex (Rash)      PMHx/PSHx/Family Hx: As above, otherwise see below   Anemia    Social Hx:  No current use of tobacco, alcohol, or illicit drugs      Medications:  MEDICATIONS  (STANDING):  famotidine    Tablet 20 milliGRAM(s) Oral daily  heparin   Injectable 5000 Unit(s) SubCutaneous every 12 hours  prenatal multivitamin 1 Tablet(s) Oral daily    MEDICATIONS  (PRN):      Vitals:  T(C): 36.6 (05-25-23 @ 05:40), Max: 37.1 (05-25-23 @ 00:11)  HR: 88 (05-25-23 @ 05:40) (82 - 103)  BP: 107/72 (05-25-23 @ 05:40) (105/79 - 110/68)  RR: 18 (05-25-23 @ 05:40) (17 - 20)  SpO2: 98% (05-25-23 @ 01:33) (98% - 99%)    Physical Examination:   General - NAD  Eyes -  Fundoscopy not performed due to safety precautions in the setting of the COVID-19 pandemic    Neurologic Exam:  Mental status - Awake, Alert, Oriented to person, place, and time. Speech fluent, repetition and naming intact. Follows simple and complex commands.     Cranial nerves - PERRL, VFF, EOMI, face sensation (V1-V3) intact b/l, facial strength intact without asymmetry b/l, hearing intact b/l, palate with symmetric elevation,  sternocleidomastiod 5/5 strength b/l, tongue midline on protrusion with full lateral movement    Motor - Normal bulk and tone throughout. No pronator drift.  Strength testing            Deltoid      Biceps      Triceps     Wrist Extension    Wrist Flexion                  R            5                 5               5                     5                              5                        5                   L             5                 5               5                     5                              5                        5                              Hip Flexion    Hip Extension    Knee Flexion    Knee Extension    Dorsiflexion    Plantar Flexion  R              5                           5                       5                           5                            5                          5  L              5                           5                        5                           5                            5                          5    Sensation - Light touch intact throughout    DTR's -             Biceps      Triceps     Brachioradialis         Patellar    Ankle    Toes/plantar response  R             2+             2+                  2+                       2+            2+                 Down  L              2+             2+                 2+                        2+           2+                 Down    Coordination - Finger to Nose intact b/l. No tremors appreciated    Gait and station Deferred due to fall/safety risk     Labs:                        10.4   9.28  )-----------( 173      ( 25 May 2023 06:07 )             32.9     05-24    137  |  105  |  7   ----------------------------<  101<H>  3.7   |  19<L>  |  0.58    Ca    9.1      24 May 2023 18:37    TPro  6.8  /  Alb  3.7  /  TBili  0.1<L>  /  DBili  x   /  AST  20  /  ALT  37  /  AlkPhos  93  05-24    CAPILLARY BLOOD GLUCOSE        LIVER FUNCTIONS - ( 24 May 2023 18:37 )  Alb: 3.7 g/dL / Pro: 6.8 g/dL / ALK PHOS: 93 U/L / ALT: 37 U/L / AST: 20 U/L / GGT: x              Neurology - Consult Note    -  Spectra: [phone] ([facility]), [phone] ([facility])  -    HPI: Patient OZZY ASH is a 27y (1995) woman with Anemia and who is 30 weeks pregnant presenting with recurrent episodes of syncope. Patient reports first episode occurred in January. She reports total 12 episodes of syncope since January and most recent episodes on May 8th. She reports prior to syncopal event she would experience SOB, palpitations and blurry vision. However, last 2 syncopal events have occurred without warning. She went to cardiologist and had holter monitor placed with and had echo done, which were reportedly normal. Once she was eating breakfast in bed and the next thing she remembers waking up in bed. She realized she passed out, but had not warning. She denies feeling confused after this episode, tongue biting or incontinence. The next time she passed out was on May 8th. She was walking up to her apartment and all of a sudden was being woken up by bystander. She had fallen down 30 steps. She reports confusion after wards. She didn't remember that she was pregnant and wasn't aware she fell. She hit her head and was bleeding. She also bit the left posterior lateral tongue. Denies previous history of head trauma, febrile seizures, weakness, numbness, headache, dizziness.       Review of Systems:    All other review of systems is negative unless indicated above.    Allergies:  Tomatoes (Hives)  methylPREDNISolone (Hives)  latex (Rash)      PMHx/PSHx/Family Hx: As above, otherwise see below   Anemia    Social Hx:  No current use of tobacco, alcohol, or illicit drugs      Medications:  MEDICATIONS  (STANDING):  famotidine    Tablet 20 milliGRAM(s) Oral daily  heparin   Injectable 5000 Unit(s) SubCutaneous every 12 hours  prenatal multivitamin 1 Tablet(s) Oral daily    MEDICATIONS  (PRN):      Vitals:  T(C): 36.6 (05-25-23 @ 05:40), Max: 37.1 (05-25-23 @ 00:11)  HR: 88 (05-25-23 @ 05:40) (82 - 103)  BP: 107/72 (05-25-23 @ 05:40) (105/79 - 110/68)  RR: 18 (05-25-23 @ 05:40) (17 - 20)  SpO2: 98% (05-25-23 @ 01:33) (98% - 99%)    Physical Examination:   General - NAD  Eyes -  Fundoscopy not performed due to safety precautions in the setting of the COVID-19 pandemic    Neurologic Exam:  Mental status - Awake, Alert, Oriented to person, place, and time. Speech fluent, repetition and naming intact. Follows simple and complex commands.     Cranial nerves - PERRL, VFF, EOMI, face sensation (V1-V3) intact b/l, facial strength intact without asymmetry b/l, hearing intact b/l, palate with symmetric elevation,  sternocleidomastiod 5/5 strength b/l, tongue midline on protrusion with full lateral movement    Motor - Normal bulk and tone throughout. No pronator drift.  Strength testing            Deltoid      Biceps      Triceps     Wrist Extension    Wrist Flexion                  R            5                 5               5                     5                              5                        5                   L             5                 5               5                     5                              5                        5                              Hip Flexion    Hip Extension    Knee Flexion    Knee Extension    Dorsiflexion    Plantar Flexion  R              5                           5                       5                           5                            5                          5  L              5                           5                        5                           5                            5                          5    Sensation - Light touch intact throughout    DTR's -             Biceps      Triceps     Brachioradialis         Patellar    Ankle    Toes/plantar response  R             2+             2+                  2+                       2+            2+                 Down  L              2+             2+                 2+                        2+           2+                 Down    Coordination - Finger to Nose intact b/l. No tremors appreciated    Gait and station Deferred due to fall/safety risk     Labs:                        10.4   9.28  )-----------( 173      ( 25 May 2023 06:07 )             32.9     05-24    137  |  105  |  7   ----------------------------<  101<H>  3.7   |  19<L>  |  0.58    Ca    9.1      24 May 2023 18:37    TPro  6.8  /  Alb  3.7  /  TBili  0.1<L>  /  DBili  x   /  AST  20  /  ALT  37  /  AlkPhos  93  05-24    CAPILLARY BLOOD GLUCOSE        LIVER FUNCTIONS - ( 24 May 2023 18:37 )  Alb: 3.7 g/dL / Pro: 6.8 g/dL / ALK PHOS: 93 U/L / ALT: 37 U/L / AST: 20 U/L / GGT: x             Imaging:  MRI B 5/10/2023:  No acute infarct, few tiny foci of FLAIR hyperintensity within cerebral white matte, nonspecific etiology. Paranasal sinus disease. An Air-Fluid level is visualized in right maxillary sinus, which is likely post-traumatric or due to sinusitis vs post-traumatic. Mild effusions wihtin right mastoid air cells.  Neurology - Consult Note    -  Spectra: [id number] ([facility]), [id number] ([facility])  -    HPI: Patient OZZY ASH is a 27y (1995) woman with Anemia and who is 30 weeks pregnant presenting with recurrent episodes of syncope. Patient reports first episode occurred in January. She reports total 12 episodes of syncope since January and most recent episodes on May 8th. She reports prior to syncopal event she would experience SOB, palpitations and blurry vision. However, last 2 syncopal events have occurred without warning. She went to cardiologist and had holter monitor placed with and had echo done, which were reportedly normal. Once she was eating breakfast in bed and the next thing she remembers waking up in bed. She realized she passed out, but had not warning. She denies feeling confused after this episode, tongue biting or incontinence. The next time she passed out was on May 8th. She was walking up to her apartment and all of a sudden was being woken up by bystander. She had fallen down 30 steps. She reports confusion after wards. She didn't remember that she was pregnant and wasn't aware she fell. She hit her head and was bleeding. She also bit the left posterior lateral tongue. Denies previous history of head trauma, febrile seizures, weakness, numbness, headache, dizziness.       Review of Systems:    All other review of systems is negative unless indicated above.    Allergies:  Tomatoes (Hives)  methylPREDNISolone (Hives)  latex (Rash)      PMHx/PSHx/Family Hx: As above, otherwise see below   Anemia    Social Hx:  No current use of tobacco, alcohol, or illicit drugs      Medications:  MEDICATIONS  (STANDING):  famotidine    Tablet 20 milliGRAM(s) Oral daily  heparin   Injectable 5000 Unit(s) SubCutaneous every 12 hours  prenatal multivitamin 1 Tablet(s) Oral daily    MEDICATIONS  (PRN):      Vitals:  T(C): 36.6 (05-25-23 @ 05:40), Max: 37.1 (05-25-23 @ 00:11)  HR: 88 (05-25-23 @ 05:40) (82 - 103)  BP: 107/72 (05-25-23 @ 05:40) (105/79 - 110/68)  RR: 18 (05-25-23 @ 05:40) (17 - 20)  SpO2: 98% (05-25-23 @ 01:33) (98% - 99%)    Physical Examination:   General - NAD  CV - Peripheral pulses palpable, no edema  Eyes -  Fundoscopy not performed due to safety precautions in the setting of the COVID-19 pandemic    Neurologic Exam:  Mental status - Awake, Alert, Oriented to person, place, and time. Speech fluent, repetition and naming intact. Follows simple and complex commands. Attention/concentration, recent and remote memory, and fund of knowledge intact    Cranial nerves - PERRL, VFF, EOMI, face sensation (V1-V3) intact b/l, facial strength intact without asymmetry b/l, hearing intact b/l, palate with symmetric elevation,  sternocleidomastoid 5/5 strength b/l, tongue midline on protrusion with full lateral movement    Motor - Normal bulk and tone throughout. No pronator drift.  Strength testing            Deltoid      Biceps      Triceps     Wrist Extension    Wrist Flexion                  R            5                 5               5                     5                              5                        5                   L             5                 5               5                     5                              5                        5                              Hip Flexion    Hip Extension    Knee Flexion    Knee Extension    Dorsiflexion    Plantar Flexion  R              5                           5                       5                           5                            5                          5  L              5                           5                        5                           5                            5                          5    Sensation - Light touch intact throughout    DTR's -             Biceps      Triceps     Brachioradialis         Patellar    Ankle    Toes/plantar response  R             2+             2+                  2+                       2+            2+                 Down  L              2+             2+                 2+                        2+           2+                 Down    Coordination - Finger to Nose intact b/l. No tremors appreciated    Gait and station - Not assessed due to fall/safety risk     Labs:                        10.4   9.28  )-----------( 173      ( 25 May 2023 06:07 )             32.9     05-24    137  |  105  |  7   ----------------------------<  101<H>  3.7   |  19<L>  |  0.58    Ca    9.1      24 May 2023 18:37    TPro  6.8  /  Alb  3.7  /  TBili  0.1<L>  /  DBili  x   /  AST  20  /  ALT  37  /  AlkPhos  93  05-24    CAPILLARY BLOOD GLUCOSE        LIVER FUNCTIONS - ( 24 May 2023 18:37 )  Alb: 3.7 g/dL / Pro: 6.8 g/dL / ALK PHOS: 93 U/L / ALT: 37 U/L / AST: 20 U/L / GGT: x             Imaging:  MRI B 5/10/2023:  No acute infarct, few tiny foci of FLAIR hyperintensity within cerebral white matte, nonspecific etiology. Paranasal sinus disease. An Air-Fluid level is visualized in right maxillary sinus, which is likely post-traumatic or due to sinusitis vs post-traumatic. Mild effusions within right mastoid air cells.

## 2023-05-25 NOTE — CONSULT NOTE ADULT - ATTENDING COMMENTS
Recurrent syncope.  Evaluation shows normal conduction intervals and normal Echo (Normal LV/RV size and function).  Given anterior T wave inversions I favor SAECG, although (again) RV size and function normal on TTE.  COnsider for ILR
HPI as per resident note, personally verified by me. Patient with 12 episodes of syncope since 1/2023 and last on 5/8 associated with fall down 14 stairs and right posterolateral (base) tongue bite. No bowel/bladder incontinence with these but does occasionally have some post-event confusion. Has occasional palpitations and abnormal chest sensation but no other prodromes. Denies any focal neurologic deficits or other abnormalities in between episodes. Outpatient work-up with Dr. Chahal has been unrevealing. MRI brain w/o at OSH o 5/10 unrevealing (see resident note for report).    Neurologic exam as per resident note with additions as below:  AAO x3, speech fluent  CN's II-XII intact  Strength 5/5 all  Sens intact all  FtN intact b/l  Downgoing b/l plantar response    A/P:  Syncope  UTI  30 weeks pregnancy    - Etiology for events sounds more peripheral/vasovagal or cardiac as opposed to neurologic (seizures) in etiology. Event of tongue biting could have been from trauma as she fell down stairs or unwitnessed tonic/clonic activity. No focal neurologic deficits on exam and outside MRI report unrevealing  - vEEG to evaluate for focal slowing, epileptiform discharges, or seizures  - Orthostatic vital signs  - Appreciate cardiology/EP input  - Continue to address above medical problems, as you are doing  - Will continue to follow patient with you

## 2023-05-25 NOTE — CONSULT NOTE ADULT - ASSESSMENT
27y (1995) woman with no significant PMH who is 30 weeks pregnant presenting with recurrent episodes of syncope. Patient reports first episode occurred in January when she was 12 weeks pregnant. She reports total 12 episodes of syncope since January and most recent episodes on May 8th. She reports prior to syncopal event she would experience SOB, palpitations and blurry vision. However, last 2 syncopal events have occurred without warning. Once she was eating breakfast in bed and the next thing she remembers waking up in bed. She realized she passed out, but had not warning. She denies feeling confused after this episode, tongue biting or incontinence. The next time she passed out was on May 8th. She was walking up to her apartment and all of a sudden was being woken up by bystander. She had fallen down 30 steps. She reports confusion after wards. She didn't remember that she was pregnant and wasn't aware she fell. She hit her head and was bleeding. She also bit the left posterior lateral tongue. She does occasionally have blurry vision with "black dots", SOB and palpitations without passing out. Denies previous history of head trauma, febrile seizures, incontinence, weakness, numbness, headache, dizziness. Exam normal.     Impression: Recurrent syncopal episodes with most recent episodes occurring without prodrome possibly vasovagal vs underlying arrythmia vs seizures     Recommendation:     To be seen and discussed with attending.   27y (1995) woman with no significant PMH who is 30 weeks pregnant presenting with recurrent episodes of syncope. Patient reports first episode occurred in January when she was 12 weeks pregnant. She reports total 12 episodes of syncope since January and most recent episodes on May 8th. She reports prior to syncopal event she would experience SOB, palpitations and blurry vision. However, last 2 syncopal events have occurred without warning. Once she was eating breakfast in bed and the next thing she remembers waking up in bed. She realized she passed out, but had not warning. She denies feeling confused after this episode, tongue biting or incontinence. The next time she passed out was on May 8th. She was walking up to her apartment and all of a sudden was being woken up by bystander. She had fallen down 30 steps. She reports confusion after wards. She didn't remember that she was pregnant and wasn't aware she fell. She hit her head and was bleeding. She also bit the left posterior lateral tongue. She does occasionally have blurry vision with "black dots", SOB and palpitations without passing out. Denies previous history of head trauma, febrile seizures, incontinence, weakness, numbness, headache, dizziness. Exam normal.     Impression: Recurrent syncopal episodes with most recent episodes occurring without prodrome possibly vasovagal vs underlying arrythmia vs seizures     Recommendation:   [] Orthostatics vitals  [] vEEG  [] MRI B w/o    Discussed with attending, to be seen.   27y (1995) woman with no significant PMH who is 30 weeks pregnant presenting with recurrent episodes of syncope. Patient reports first episode occurred in January when she was 12 weeks pregnant. She reports total 12 episodes of syncope since January and most recent episodes on May 8th. She reports prior to syncopal event she would experience SOB, palpitations and blurry vision. However, last 2 syncopal events have occurred without warning. Once she was eating breakfast in bed and the next thing she remembers waking up in bed. She realized she passed out, but had not warning. She denies feeling confused after this episode, tongue biting or incontinence. The next time she passed out was on May 8th. She was walking up to her apartment and all of a sudden was being woken up by bystander. She had fallen down 30 steps. She reports confusion after wards. She didn't remember that she was pregnant and wasn't aware she fell. She hit her head and was bleeding. She also bit the left posterior lateral tongue. She does occasionally have blurry vision with "black dots", SOB and palpitations without passing out. Denies previous history of head trauma, febrile seizures, incontinence, weakness, numbness, headache, dizziness. Exam normal.     Impression: Recurrent syncopal episodes with most recent episodes occurring without prodrome and 1 episode of tongue biting possibly vasovagal vs underlying arrythmia vs seizures     Recommendation:   [] Orthostatics vitals  [] vEEG  [] MRI B w/o    Discussed with attending, to be seen.   27y (1995) woman with no significant PMH who is 30 weeks pregnant presenting with recurrent episodes of syncope. Patient reports first episode occurred in January when she was 12 weeks pregnant. She reports total 12 episodes of syncope since January and most recent episodes on May 8th. She reports prior to syncopal event she would experience SOB, palpitations and blurry vision. However, last 2 syncopal events have occurred without warning. Once she was eating breakfast in bed and the next thing she remembers waking up in bed. She realized she passed out, but had not warning. She denies feeling confused after this episode, tongue biting or incontinence. The next time she passed out was on May 8th. She was walking up to her apartment and all of a sudden was being woken up by bystander. She had fallen down 30 steps. She reports confusion after wards. She didn't remember that she was pregnant and wasn't aware she fell. She hit her head and was bleeding. She also bit the left posterior lateral tongue. She does occasionally have blurry vision with "black dots", SOB and palpitations without passing out. Denies previous history of head trauma, febrile seizures, incontinence, weakness, numbness, headache, dizziness. Exam normal.     Impression: Recurrent syncopal episodes with most recent episodes occurring without prodrome and 1 episode of tongue biting possibly vasovagal vs underlying arrythmia vs seizures     Recommendation:   [] Orthostatics vitals  [] vEEG  [x] MRI B w/o done May 10 at outside hospital, report normal. (results noted above). No need to repeat head imaging at this time.     Discussed and seen with attending.

## 2023-05-25 NOTE — CHART NOTE - NSCHARTNOTEFT_GEN_A_CORE
R3 Chart note    Patient seen at bedside for BPP. Patient with no complaints at this time.   BSUS: Breech, posterior, bpp 8/8, deidre 15    d/w Dr. Jeremy Paniagua PGY3

## 2023-05-25 NOTE — PROGRESS NOTE ADULT - ASSESSMENT
26 y/o  @30w3d with PNC c/b multiple syncopal episodes of unknown etiology admitted to antepartum service for cardiology and neurology evaluation of syncopal episodes. Last syncopal episode on . Patient without any obstetric complaints. Denies any complaints at this time.     #Syncopal episodes  - Cardiology following, appreciating their recommendations  - F/u neurology consult  - C/w telemetry monitoring  - f/u TTE    #Fetal wellbeing  - NICU consult PRN  - f/u GBS ()    #Maternal wellbeing  - Regular diet  - HSQ/SCDs for DVT ppx  - PNV/IronFolic acid  - f/u UCx    Dayna Zhao, PGY-3  28 y/o  @30w3d with PNC c/b multiple syncopal episodes of unknown etiology admitted to antepartum service for cardiology and neurology evaluation of syncopal episodes. Last syncopal episode on . Patient without any obstetric complaints. Denies any complaints at this time.     #Syncopal episodes  - Cardiology following, appreciating their recommendations  - F/u neurology consult  - C/w telemetry monitoring  - f/u TTE    #Fetal wellbeing  - NICU consult PRN  - f/u GBS ()    #Maternal wellbeing  - Regular diet  - HSQ/SCDs for DVT ppx  - PNV/IronFolic acid  - f/u UCx    Dayna Zhao, PGY-3       MFM Fellow Addendum    28 y/o  at 30w3d with pregnancy complicated by  multiple syncopal episodes of unknown etiology admitted to antepartum service for cardiology and neurology evaluation of syncopal episodes. VSS. Asxs. Patient with some palpitations this morning otherwise asymptomatic. Apperciate recs from consulting services currently investigation cardiogenic vs neurogenic etiology for syncopal episodes. Fetal status reassuring. Will continue to monitor closely.    Patient seen with Dr. Jurado (M attending)    Reid Ferguson M.D. FACOG PGY-6  Maternal Fetal Medicine Fellow  Cell: [phone] if after 5pm or weekend ask labor and delivery for on call fellow

## 2023-05-26 ENCOUNTER — TRANSCRIPTION ENCOUNTER (OUTPATIENT)
Age: 28
End: 2023-05-26

## 2023-05-26 ENCOUNTER — ASOB RESULT (OUTPATIENT)
Age: 28
End: 2023-05-26

## 2023-05-26 ENCOUNTER — APPOINTMENT (OUTPATIENT)
Dept: ANTEPARTUM | Facility: CLINIC | Age: 28
End: 2023-05-26
Payer: COMMERCIAL

## 2023-05-26 LAB
CULTURE RESULTS: SIGNIFICANT CHANGE UP
GROUP B BETA STREP DNA (PCR): DETECTED
SOURCE GROUP B STREP: SIGNIFICANT CHANGE UP
SPECIMEN SOURCE: SIGNIFICANT CHANGE UP

## 2023-05-26 PROCEDURE — 76816 OB US FOLLOW-UP PER FETUS: CPT | Mod: 26

## 2023-05-26 PROCEDURE — 76818 FETAL BIOPHYS PROFILE W/NST: CPT | Mod: 26,59

## 2023-05-26 PROCEDURE — 95720 EEG PHY/QHP EA INCR W/VEEG: CPT

## 2023-05-26 PROCEDURE — ZZZZZ: CPT

## 2023-05-26 PROCEDURE — 93010 ELECTROCARDIOGRAM REPORT: CPT

## 2023-05-26 PROCEDURE — 99232 SBSQ HOSP IP/OBS MODERATE 35: CPT | Mod: 25

## 2023-05-26 RX ADMIN — Medication 1 TABLET(S): at 17:14

## 2023-05-26 RX ADMIN — FAMOTIDINE 20 MILLIGRAM(S): 10 INJECTION INTRAVENOUS at 17:13

## 2023-05-26 RX ADMIN — HEPARIN SODIUM 5000 UNIT(S): 5000 INJECTION INTRAVENOUS; SUBCUTANEOUS at 17:13

## 2023-05-26 NOTE — DISCHARGE NOTE ANTEPARTUM - PATIENT PORTAL LINK FT
You can access the FollowMyHealth Patient Portal offered by [facility] by registering at the following website: [email or web address]. By joining [facility]’s FollowMyHealth portal, you will also be able to view your health information using other applications (apps) compatible with our system.

## 2023-05-26 NOTE — DISCHARGE NOTE ANTEPARTUM - CARE PLAN
1 Principal Discharge DX:	Syncope  Assessment and plan of treatment:	follow up with your cardiologist  follow up with your OBGYN or in our clinic  please call you doctor if you experience new or worsening symptoms

## 2023-05-26 NOTE — PROGRESS NOTE ADULT - SUBJECTIVE AND OBJECTIVE BOX
DATE OF SERVICE: 05-26-23 @ 16:36    Patient is a 27y old  Female who presents with a chief complaint of Syncope (24 May 2023 18:49)      INTERVAL HISTORY: Reports intermittent palpitations associated w/ dizziness    REVIEW OF SYSTEMS:  CONSTITUTIONAL: No weakness  EYES/ENT: No visual changes;  No throat pain   NECK: No pain or stiffness  RESPIRATORY: No cough, wheezing; No shortness of breath  CARDIOVASCULAR: No chest pain or palpitations  GASTROINTESTINAL: No abdominal  pain. No nausea, vomiting, or hematemesis  GENITOURINARY: No dysuria, frequency or hematuria  NEUROLOGICAL: No stroke like symptoms  SKIN: No rashes    TELEMETRY Personally reviewed: SR/ST   	  MEDICATIONS:        PHYSICAL EXAM:  T(C): 36.9 (05-26-23 @ 14:00), Max: 37.1 (05-26-23 @ 08:03)  HR: 99 (05-26-23 @ 14:00) (82 - 113)  BP: 106/72 (05-26-23 @ 14:00) (102/71 - 126/74)  RR: 16 (05-26-23 @ 14:00) (14 - 25)  SpO2: 99% (05-26-23 @ 14:00) (98% - 100%)  Wt(kg): --  I&O's Summary        Appearance: In no distress	  HEENT:    PERRL, EOMI	  Cardiovascular:  S1 S2, No JVD  Respiratory: Lungs clear to auscultation	  Gastrointestinal:  Soft, Non-tender, + BS	  Vascularature:  No edema of LE  Psychiatric: Appropriate affect   Neuro: no acute focal deficits                               10.4   9.28  )-----------( 173      ( 25 May 2023 06:07 )             32.9     05-24    137  |  105  |  7   ----------------------------<  101<H>  3.7   |  19<L>  |  0.58    Ca    9.1      24 May 2023 18:37    TPro  6.8  /  Alb  3.7  /  TBili  0.1<L>  /  DBili  x   /  AST  20  /  ALT  37  /  AlkPhos  93  05-24        Labs personally reviewed      ASSESSMENT/PLAN: 	  Ms. Sosa is a 27 year old female with no significant PMH who is currently 30 weeks pregnant and presents with multiple episodes of syncope and near syncope since 12 weeks gestations. Of note, patient is high risk for she has had 2 early miscarriages and an ectopic pregnancy. She states that the syncopal episodes are usually preceded by palpitations, chest pressure, ringing in the ears, weakness in lower extremity and visual changes. The most recent syncopal episode on 5/8 was without promodal symtoms and was traumatic (patient fell down a flight of stairs). She was evaluated at [facility] where and TTE was done and discharged for OP cardiology follow up. She has just completed holter monitoring. She also reports NIELSON and mild LE edema but unsure if it is 2/2 pregnancy. Was sent in by Dr. Chahal following his initial evaluation for further workup. Patient currently denies CP, palpitations, orthopnea or pND.     1. Syncope  - Multiple syncopal episodes with most recent episode with trauma and no prodrome concerning with possible conduction or structural defects  - Some syncopal episodes a/b chest pressure and palpitations  - ECG NSR twi III, V2, V3  - TTE revealing LV systolic function is normal EF 65 %, normal RV size and function.Trace pericardial effusion noted adjacent to the posterior left ventricle.  - Appreciate EP consult and recommendations  - Monitor on tele  - CBC/CMP wnl  - Appreciate Neuro eval: 24 hour EEG pending  - S/p  ILR inserted. Wound check arranged for 6/12/23     2. High Risk Pregnancy  - Appreciate Ob/Gyn recommendations for monitoring of mother/baby          FELICIANO Yen DO [facility]  Cardiovascular Medicine  [address], [address]  Available through call or text on [employer] TE[employer]  Office: [phone]   DATE OF SERVICE: 05-26-23 @ 16:36    Patient is a 27y old  Female who presents with a chief complaint of Syncope (24 May 2023 18:49)      INTERVAL HISTORY: Reports intermittent palpitations associated w/ dizziness    REVIEW OF SYSTEMS:  CONSTITUTIONAL: No weakness  EYES/ENT: No visual changes;  No throat pain   NECK: No pain or stiffness  RESPIRATORY: No cough, wheezing; No shortness of breath  CARDIOVASCULAR: No chest pain or palpitations  GASTROINTESTINAL: No abdominal  pain. No nausea, vomiting, or hematemesis  GENITOURINARY: No dysuria, frequency or hematuria  NEUROLOGICAL: No stroke like symptoms  SKIN: No rashes    TELEMETRY Personally reviewed: SR/ST   	  MEDICATIONS:        PHYSICAL EXAM:  T(C): 36.9 (05-26-23 @ 14:00), Max: 37.1 (05-26-23 @ 08:03)  HR: 99 (05-26-23 @ 14:00) (82 - 113)  BP: 106/72 (05-26-23 @ 14:00) (102/71 - 126/74)  RR: 16 (05-26-23 @ 14:00) (14 - 25)  SpO2: 99% (05-26-23 @ 14:00) (98% - 100%)  Wt(kg): --  I&O's Summary        Appearance: In no distress	  HEENT:    PERRL, EOMI	  Cardiovascular:  S1 S2, No JVD  Respiratory: Lungs clear to auscultation	  Gastrointestinal:  Soft, Non-tender, + BS	  Vascularature:  No edema of LE  Psychiatric: Appropriate affect   Neuro: no acute focal deficits                               10.4   9.28  )-----------( 173      ( 25 May 2023 06:07 )             32.9     05-24    137  |  105  |  7   ----------------------------<  101<H>  3.7   |  19<L>  |  0.58    Ca    9.1      24 May 2023 18:37    TPro  6.8  /  Alb  3.7  /  TBili  0.1<L>  /  DBili  x   /  AST  20  /  ALT  37  /  AlkPhos  93  05-24        Labs personally reviewed      ASSESSMENT/PLAN: 	  Ms. Sosa is a 27 year old female with no significant PMH who is currently 30 weeks pregnant and presents with multiple episodes of syncope and near syncope since 12 weeks gestations. Of note, patient is high risk for she has had 2 early miscarriages and an ectopic pregnancy. She states that the syncopal episodes are usually preceded by palpitations, chest pressure, ringing in the ears, weakness in lower extremity and visual changes. The most recent syncopal episode on 5/8 was without promodal symtoms and was traumatic (patient fell down a flight of stairs). She was evaluated at [facility] where and TTE was done and discharged for OP cardiology follow up. She has just completed holter monitoring. She also reports NIELSON and mild LE edema but unsure if it is 2/2 pregnancy. Was sent in by Dr. Chahal following his initial evaluation for further workup. Patient currently denies CP, palpitations, orthopnea or pND.     1. Syncope  - Multiple syncopal episodes with most recent episode with trauma and no prodrome concerning with possible conduction or structural defects  - Some syncopal episodes a/b chest pressure and palpitations  - ECG NSR twi III, V2, V3  - TTE revealing LV systolic function is normal EF 65 %, normal RV size and function.Trace pericardial effusion noted adjacent to the posterior left ventricle.  - Appreciate EP consult and recommendations  - Monitor on tele  - CBC/CMP wnl  - Appreciate Neuro eval: 24 hour EEG pending  - S/p  ILR inserted. Wound check arranged for 6/12/23     2. High Risk Pregnancy  - Appreciate Ob/Gyn recommendations for monitoring of mother/baby      No further inpt cardiac work up planned. Awaiting final neurology recs      FELICIANO Yen DO [facility]  Cardiovascular Medicine  [address], [address]  Available through call or text on [employer] TEAM[employer]  Office: [phone]

## 2023-05-26 NOTE — PROGRESS NOTE ADULT - SUBJECTIVE AND OBJECTIVE BOX
R3 Antepartum Note, HD#3    Interval events: Patient seen and examined at bedside, no acute overnight events. No acute complaints. Denies CP or palpitations. Pt reports +FM, denies LOF, VB, ctx, HA, epigastric pain, blurred vision, SOB, N/V, fevers, and chills.    Vital Signs Last 24 Hours  T(C): 37.0 (05-26-23 @ 06:18), Max: 37.0 (05-25-23 @ 11:20)  HR: 82 (05-26-23 @ 06:17) (82 - 114)  BP: 102/71 (05-26-23 @ 06:17) (102/70 - 123/62)  RR: 18 (05-26-23 @ 02:26) (14 - 18)  SpO2: --    Physical Exam:  General: NAD  Abdomen: Soft, non-tender, gravid  Ext: No pain or swelling    NST initially with gradual onset decelerations, longest lasting 6min, after prolonged monitoring NST with moderate variability and +accels, - decels    Labs:             10.4   9.28  )-----------( 173      ( 05-25 @ 06:07 )             32.9     05-24 @ 18:37    137  |  105  |  7   ----------------------------<  101  3.7   |  19  |  0.58    Ca    9.1      05-24 @ 18:37    TPro  6.8  /  Alb  3.7  /  TBili  0.1  /  DBili  x   /  AST  20  /  ALT  37  /  AlkPhos  93  05-24 @ 18:37            MEDICATIONS  (STANDING):  famotidine    Tablet 20 milliGRAM(s) Oral daily  heparin   Injectable 5000 Unit(s) SubCutaneous every 12 hours  prenatal multivitamin 1 Tablet(s) Oral daily    MEDICATIONS  (PRN):

## 2023-05-26 NOTE — PROGRESS NOTE ADULT - ASSESSMENT
27 year old Female PMHx of  2 miscarriages and 1 ectopic pregnancy prior and  Mhx of GERD and Anemia who is  @30w3d (LAZ 23 by first trimester US)   presents with multiple syncopal episodes    #Syncope  -Approx 12 episodes since 23. Most episodes with prodrome of palpitations, general fatigue, blurry vision and vison going dark before passing out. Two episodes fo no prodrome, including one where she fell down a  flight of stairs.   -Her last syncopal episode was 23 during which she fell down a flight of stairs and went to [facility] with report of normal TTE, MRI, and CTH, she also had EEG which she thinks was wnl, but not sure.   -No family history of SCD or syncope, no personal hx  of syncope prior to pregnancy  -She had holter 4/3/23 to 23 which showed 2 degree type 1 at 71 bpm.  -EKG sinus rhythm with normal axis, LAE, Downsloping ST with TWI V1-V3.   - Liset Bipolar precordial leads did not detect Epsilon waves.   - Neuro following, planned for vEEG.   - F/U on Lyme AB, TSH/FT4  -Tele: sinus 's w/ associated palpitations/dizziness.   -No signal average EKG software available here.   -Discussed risks/benefits/alternatives of ILR with patient. She is agreeable and would like to proceed with procedure.   -- Consent obtained.   -- ILR inserted. Post procedure instructions reviewed and written instructions provided.   -Wound check arranged for 23 at 340PM  -EP will sign off, please reconsult with any questions.   -Discussed with EP attending and primary team.

## 2023-05-26 NOTE — DISCHARGE NOTE ANTEPARTUM - MEDICATION SUMMARY - MEDICATIONS TO TAKE
I will START or STAY ON the medications listed below when I get home from the hospital:    Macrobid 100 mg oral capsule  -- 1 cap(s) by mouth every 12 hours  -- Indication: For UTI

## 2023-05-26 NOTE — DISCHARGE NOTE ANTEPARTUM - PLAN OF CARE
follow up with your cardiologist  follow up with your OBGYN or in our clinic  please call you doctor if you experience new or worsening symptoms

## 2023-05-26 NOTE — DISCHARGE NOTE ANTEPARTUM - HOSPITAL COURSE
26 y/o  @30w3d with PNC c/b multiple syncopal episodes of unknown etiology admitted to antepartum service for cardiology and neurology evaluation of syncopal episodes. Last syncopal episode on . Patient without any obstetric complaints throughout admission and fetal status was reassuring.  ATU(): Breech, posterior, APOORVA 15, 1678g (52%), BPP / EP, Cardiology, and Neurology following. Patient s/p loop recorder placed on . EEG performed on showed**** . Patient discharged home on HD*** in stable condition.    28 y/o  @30w3d with PNC c/b multiple syncopal episodes of unknown etiology admitted to antepartum service for cardiology and neurology evaluation of syncopal episodes. Last syncopal episode on . Patient without any obstetric complaints throughout admission and fetal status was reassuring.  ATU(): Breech, posterior, APOORVA 15, 1678g (52%), BPP / EP, Cardiology, and Neurology following. Patient s/p loop recorder placed on . EEG performed on showed no seizure activity . Patient discharged home on HD4 in stable condition.

## 2023-05-26 NOTE — DISCHARGE NOTE ANTEPARTUM - INSTRUCTIONS
[address] [facility], [place] [address] [phone] Keep your follow up appointment with doctor as instructed and call doctor with recurrence of syncope, falls and with any questions or concerns.

## 2023-05-26 NOTE — DISCHARGE NOTE ANTEPARTUM - PROVIDER TOKENS
FREE:[LAST:[HR],PHONE:[(   )    -],FAX:[(   )    -],ADDRESS:[[address] [place] [address] [address] [address] [phone]]]

## 2023-05-26 NOTE — PROGRESS NOTE ADULT - ASSESSMENT
26 y/o  @30w4d with PNC c/b multiple syncopal episodes of unknown etiology admitted to antepartum service for cardiology and neurology evaluation of syncopal episodes. Last syncopal episode on . Patient without any obstetric complaints. Overnight NST notable for gradual onset decelerations, resolved after prolonged monitoring    #Syncopal episodes  - Cardiology and EP following, appreciating their recommendations  - Neurology following, appreciating their recommendations (recommend vEEG)  - C/w telemetry monitoring  - TTE(): wnl, EF 65%    #Fetal wellbeing  - for ATU scan today  - BSUS: vtx, posterior, MVP 7.9cm, BPP 8/8  - NICU consult PRN  - f/u GBS ()    #Maternal wellbeing  - Regular diet  - HSQ/SCDs for DVT ppx  - PNV/IronFolic acid  - f/u UCx    Dayna Zhao, PGY-3  28 y/o  @30w4d with PNC c/b multiple syncopal episodes of unknown etiology admitted to antepartum service for cardiology and neurology evaluation of syncopal episodes. Last syncopal episode on . Patient without any obstetric complaints. Overnight NST notable for gradual onset decelerations, resolved after prolonged monitoring    #Syncopal episodes  - Cardiology and EP following, appreciating their recommendations  - Neurology following, appreciating their recommendations (recommend vEEG)  - C/w telemetry monitoring  - TTE(): wnl, EF 65%    #Fetal wellbeing  - for ATU scan today  - BSUS: vtx, posterior, MVP 7.9cm, BPP 8/8  - NICU consult PRN  - f/u GBS ()    #Maternal wellbeing  - Regular diet  - HSQ/SCDs for DVT ppx  - PNV/IronFolic acid  - f/u UCx    Dayna Zhao, PGY-3       MFM Fellow Addendum    28 y/o  at 30w4d with pregnancy complicated by  multiple syncopal episodes of unknown etiology admitted to antepartum service for cardiology and neurology evaluation of syncopal episodes. VSS. Asxs. Patient with some palpitations this morning otherwise asymptomatic. Apperciate recs from consulting services currently investigation cardiogenic vs neurogenic etiology for syncopal episodes. Fetal status reassuring. Will continue to monitor closely. Plan for loop recorder placement today. 24hr video eeg pending.    Patient seen with Dr. Jurado ([facility] attending)    Reid Ferguson M.D. FACOG PGY-6  Maternal Fetal Medicine Fellow  Cell: [phone] if after 5pm or weekend ask labor and delivery for on call fellow

## 2023-05-26 NOTE — PROGRESS NOTE ADULT - ATTENDING COMMENTS
MFM Attending    27 year old  at 30w4d with pregnancy complicated by  multiple syncopal episodes of unknown etiology admitted to antepartum service for cardiology and neurology evaluation of syncopal episodes.   -s/p cardiology consultation-recommendations appreciated  -s/p neurology consultation-recommendations appreciated    R3OB and MFM fellow notes reviewed  Agree with A&P    Patient seen and evaluated by me with the MFM team  Patient doing well, does not endorse any new symptoms    -Continue OBS/BR-currently no OB issues. On L&D for telemetry  -Cardiology-loop recorder placement today  -Neurology-24hr video EEG    Gretchen Jurado MD, MPH
MFM Attending    27 year old  at 30w3d admitted due to multiple syncopal episodes of unknown etiology  to antepartum service for cardiology and neurology evaluation of syncopal episodes. Last syncopal episode on .   -s/p neurology consultation-recommendations appreciated  -s/p cardiology consultation-recommendations appreciated    R3OB and MFM fellow notes reviewed  Agree with A&P    Patient seen and evaluated by me with the MFM team  Patient doing well, does not endorse any new symptoms    -Continue OBS/BR      Gretchen Jurdao MD,MPH

## 2023-05-26 NOTE — DISCHARGE NOTE ANTEPARTUM - NS MD DC FALL RISK RISK
For information on Fall & Injury Prevention, visit: [email or web address] OR  [email or web address] OR  https://www.cdc.gov/steadi/patient.html

## 2023-05-26 NOTE — DISCHARGE NOTE ANTEPARTUM - CARE PROVIDER_API CALL
[facility],   [address] [place] [address] [address], [place] [address] [phone]  Phone: (   )    -  Fax: (   )    -  Follow Up Time:

## 2023-05-26 NOTE — PROGRESS NOTE ADULT - SUBJECTIVE AND OBJECTIVE BOX
24H hour events: Tele with SR - ST rates ~80-120s. No events. Pt reports some palpitations w/ associated dizziness overnight, also while being seen this AM. Tele with sinus tach. Pt states last syncopal episode did not have similar symptoms as she is currently complaining off.     MEDICATIONS:  heparin   Injectable 5000 Unit(s) SubCutaneous every 12 hours  famotidine    Tablet 20 milliGRAM(s) Oral daily  prenatal multivitamin 1 Tablet(s) Oral daily    REVIEW OF SYSTEMS:  Complete 12point ROS negative.    PHYSICAL EXAM:  T(C): 36.5 (05-26-23 @ 12:16), Max: 37.1 (05-26-23 @ 08:03)  HR: 113 (05-26-23 @ 13:10) (82 - 113)  BP: 117/68 (05-26-23 @ 13:10) (102/71 - 126/74)  RR: 15 (05-26-23 @ 13:10) (14 - 25)  SpO2: 100% (05-26-23 @ 13:10) (99% - 100%)  Wt(kg): --  I&O's Summary      Appearance: Normal	  HEENT: NC/AT  Cardiovascular: tachycardic  Respiratory: Lungs clear to auscultation	  Psychiatry: A & O x 3, Mood & affect appropriate  Neurologic: Non-focal  Extremities: No BLE edema        LABS:	 	    CBC Full  -  ( 25 May 2023 06:07 )  WBC Count : 9.28 K/uL  Hemoglobin : 10.4 g/dL  Hematocrit : 32.9 %  Platelet Count - Automated : 173 K/uL  Mean Cell Volume : 83.5 fl  Mean Cell Hemoglobin : 26.4 pg  Mean Cell Hemoglobin Concentration : 31.6 gm/dL  Auto Neutrophil # : 6.71 K/uL  Auto Lymphocyte # : 1.79 K/uL  Auto Monocyte # : 0.55 K/uL  Auto Eosinophil # : 0.04 K/uL  Auto Basophil # : 0.02 K/uL  Auto Neutrophil % : 72.4 %  Auto Lymphocyte % : 19.3 %  Auto Monocyte % : 5.9 %  Auto Eosinophil % : 0.4 %  Auto Basophil % : 0.2 %    05-24    137  |  105  |  7   ----------------------------<  101<H>  3.7   |  19<L>  |  0.58    Ca    9.1      24 May 2023 18:37    TPro  6.8  /  Alb  3.7  /  TBili  0.1<L>  /  DBili  x   /  AST  20  /  ALT  37  /  AlkPhos  93  05-24    CARDIAC MARKERS:Troponin T, High Sensitivity (05.24.23 @ 18:37)    Troponin T, High Sensitivity Result: <6: Specimen not hemolyzed  *  *  Rapid upward or downward changes in high-sensitivity troponin levels  suggest acute myocardial injury. Renal impairment may cause sustained  troponin elevations.  Normal: <6 - 14 ng/L  Indeterminate: 15-51 ng/L  Elevated: > 51 ng/L  See http://labs/test/TROPTHS on the [facility] intranet for more  information ng/L      PREVIOUS DIAGNOSTIC TESTING:    [ ] Echocardiogram: < from: TTE W or WO Ultrasound Enhancing Agent (05.25.23 @ 08:14) >  CONCLUSIONS:      1. The left ventricular systolic function is normal with an ejection fraction visually estimated at 65 %.   2. Normal right ventricular cavity size and normal systolic function.   3. Trace pericardial effusion noted adjacent to the posterior left ventricle.   4. No prior echocardiogram is available for comparison.    < end of copied text >

## 2023-05-27 VITALS
OXYGEN SATURATION: 98 % | TEMPERATURE: 98 F | RESPIRATION RATE: 18 BRPM | HEART RATE: 89 BPM | SYSTOLIC BLOOD PRESSURE: 104 MMHG | DIASTOLIC BLOOD PRESSURE: 71 MMHG

## 2023-05-27 LAB
A1C WITH ESTIMATED AVERAGE GLUCOSE RESULT: 5.5 % — SIGNIFICANT CHANGE UP (ref 4–5.6)
APPEARANCE UR: CLEAR — SIGNIFICANT CHANGE UP
B BURGDOR C6 AB SER-ACNC: NEGATIVE — SIGNIFICANT CHANGE UP
B BURGDOR IGG+IGM SER-ACNC: 0.1 INDEX — SIGNIFICANT CHANGE UP (ref 0.01–0.89)
BACTERIA # UR AUTO: NEGATIVE — SIGNIFICANT CHANGE UP
BILIRUB UR-MCNC: NEGATIVE — SIGNIFICANT CHANGE UP
CHOLEST SERPL-MCNC: 189 MG/DL — SIGNIFICANT CHANGE UP
COLOR SPEC: YELLOW — SIGNIFICANT CHANGE UP
DIFF PNL FLD: NEGATIVE — SIGNIFICANT CHANGE UP
EPI CELLS # UR: 1 /HPF — SIGNIFICANT CHANGE UP
ESTIMATED AVERAGE GLUCOSE: 111 MG/DL — SIGNIFICANT CHANGE UP (ref 68–114)
GLUCOSE UR QL: NEGATIVE — SIGNIFICANT CHANGE UP
HDLC SERPL-MCNC: 92 MG/DL — SIGNIFICANT CHANGE UP
HYALINE CASTS # UR AUTO: 1 /LPF — SIGNIFICANT CHANGE UP (ref 0–2)
KETONES UR-MCNC: NEGATIVE — SIGNIFICANT CHANGE UP
LEUKOCYTE ESTERASE UR-ACNC: NEGATIVE — SIGNIFICANT CHANGE UP
LIPID PNL WITH DIRECT LDL SERPL: 77 MG/DL — SIGNIFICANT CHANGE UP
NITRITE UR-MCNC: NEGATIVE — SIGNIFICANT CHANGE UP
NON HDL CHOLESTEROL: 97 MG/DL — SIGNIFICANT CHANGE UP
PH UR: 6.5 — SIGNIFICANT CHANGE UP (ref 5–8)
PROT UR-MCNC: ABNORMAL
RBC CASTS # UR COMP ASSIST: 0 /HPF — SIGNIFICANT CHANGE UP (ref 0–4)
SP GR SPEC: 1.03 — HIGH (ref 1.01–1.02)
T3 SERPL-MCNC: 182 NG/DL — SIGNIFICANT CHANGE UP (ref 80–200)
T4 AB SER-ACNC: 9.2 UG/DL — SIGNIFICANT CHANGE UP (ref 4.6–12)
T4 FREE SERPL-MCNC: 1 NG/DL — SIGNIFICANT CHANGE UP (ref 0.9–1.8)
TRIGL SERPL-MCNC: 101 MG/DL — SIGNIFICANT CHANGE UP
TSH SERPL-MCNC: 3 UIU/ML — SIGNIFICANT CHANGE UP (ref 0.27–4.2)
UROBILINOGEN FLD QL: NEGATIVE — SIGNIFICANT CHANGE UP
WBC UR QL: 2 /HPF — SIGNIFICANT CHANGE UP (ref 0–5)

## 2023-05-27 PROCEDURE — 80053 COMPREHEN METABOLIC PANEL: CPT

## 2023-05-27 PROCEDURE — 87389 HIV-1 AG W/HIV-1&-2 AB AG IA: CPT

## 2023-05-27 PROCEDURE — 76376 3D RENDER W/INTRP POSTPROCES: CPT

## 2023-05-27 PROCEDURE — 84439 ASSAY OF FREE THYROXINE: CPT

## 2023-05-27 PROCEDURE — 96374 THER/PROPH/DIAG INJ IV PUSH: CPT

## 2023-05-27 PROCEDURE — 86780 TREPONEMA PALLIDUM: CPT

## 2023-05-27 PROCEDURE — 87086 URINE CULTURE/COLONY COUNT: CPT

## 2023-05-27 PROCEDURE — 99285 EMERGENCY DEPT VISIT HI MDM: CPT

## 2023-05-27 PROCEDURE — 86618 LYME DISEASE ANTIBODY: CPT

## 2023-05-27 PROCEDURE — 87653 STREP B DNA AMP PROBE: CPT

## 2023-05-27 PROCEDURE — 84480 ASSAY TRIIODOTHYRONINE (T3): CPT

## 2023-05-27 PROCEDURE — 86901 BLOOD TYPING SEROLOGIC RH(D): CPT

## 2023-05-27 PROCEDURE — 84484 ASSAY OF TROPONIN QUANT: CPT

## 2023-05-27 PROCEDURE — 86900 BLOOD TYPING SEROLOGIC ABO: CPT

## 2023-05-27 PROCEDURE — 95700 EEG CONT REC W/VID EEG TECH: CPT

## 2023-05-27 PROCEDURE — C1764: CPT

## 2023-05-27 PROCEDURE — 80061 LIPID PANEL: CPT

## 2023-05-27 PROCEDURE — 84443 ASSAY THYROID STIM HORMONE: CPT

## 2023-05-27 PROCEDURE — 99233 SBSQ HOSP IP/OBS HIGH 50: CPT

## 2023-05-27 PROCEDURE — 36415 COLL VENOUS BLD VENIPUNCTURE: CPT

## 2023-05-27 PROCEDURE — 95714 VEEG EA 12-26 HR UNMNTR: CPT

## 2023-05-27 PROCEDURE — 93005 ELECTROCARDIOGRAM TRACING: CPT

## 2023-05-27 PROCEDURE — 87340 HEPATITIS B SURFACE AG IA: CPT

## 2023-05-27 PROCEDURE — 33285 INSJ SUBQ CAR RHYTHM MNTR: CPT

## 2023-05-27 PROCEDURE — 85025 COMPLETE CBC W/AUTO DIFF WBC: CPT

## 2023-05-27 PROCEDURE — 84436 ASSAY OF TOTAL THYROXINE: CPT

## 2023-05-27 PROCEDURE — 93306 TTE W/DOPPLER COMPLETE: CPT

## 2023-05-27 PROCEDURE — 81001 URINALYSIS AUTO W/SCOPE: CPT

## 2023-05-27 PROCEDURE — 86762 RUBELLA ANTIBODY: CPT

## 2023-05-27 PROCEDURE — 86850 RBC ANTIBODY SCREEN: CPT

## 2023-05-27 PROCEDURE — 93356 MYOCRD STRAIN IMG SPCKL TRCK: CPT

## 2023-05-27 PROCEDURE — 83036 HEMOGLOBIN GLYCOSYLATED A1C: CPT

## 2023-05-27 RX ORDER — NITROFURANTOIN MACROCRYSTAL 50 MG
1 CAPSULE ORAL
Qty: 10 | Refills: 0
Start: 2023-05-27 | End: 2023-05-31

## 2023-05-27 RX ORDER — NITROFURANTOIN MACROCRYSTAL 50 MG
100 CAPSULE ORAL
Refills: 0 | Status: DISCONTINUED | OUTPATIENT
Start: 2023-05-27 | End: 2023-05-27

## 2023-05-27 RX ADMIN — HEPARIN SODIUM 5000 UNIT(S): 5000 INJECTION INTRAVENOUS; SUBCUTANEOUS at 05:52

## 2023-05-27 RX ADMIN — FAMOTIDINE 20 MILLIGRAM(S): 10 INJECTION INTRAVENOUS at 12:11

## 2023-05-27 RX ADMIN — Medication 1 TABLET(S): at 12:10

## 2023-05-27 RX ADMIN — Medication 100 MILLIGRAM(S): at 13:08

## 2023-05-27 NOTE — PROGRESS NOTE ADULT - SUBJECTIVE AND OBJECTIVE BOX
DATE OF SERVICE: 05-27-23 @ 10:22    Patient is a 27y old  Female who presents with a chief complaint of Syncope (26 May 2023 17:19)      INTERVAL HISTORY: Feels ok.     REVIEW OF SYSTEMS:  CONSTITUTIONAL: No weakness  EYES/ENT: No visual changes;  No throat pain   NECK: No pain or stiffness  RESPIRATORY: No cough, wheezing; No shortness of breath  CARDIOVASCULAR: No chest pain or palpitations  GASTROINTESTINAL: No abdominal  pain. No nausea, vomiting, or hematemesis  GENITOURINARY: No dysuria, frequency or hematuria  NEUROLOGICAL: No stroke like symptoms  SKIN: No rashes    	  MEDICATIONS:        PHYSICAL EXAM:  T(C): 36.6 (05-27-23 @ 09:00), Max: 37.2 (05-26-23 @ 21:40)  HR: 100 (05-27-23 @ 09:00) (92 - 113)  BP: 102/70 (05-27-23 @ 09:00) (101/66 - 126/74)  RR: 18 (05-27-23 @ 09:00) (15 - 25)  SpO2: 98% (05-27-23 @ 09:00) (98% - 100%)  Wt(kg): --  I&O's Summary        Appearance: In no distress	  HEENT:    PERRL, EOMI	  Cardiovascular:  S1 S2, No JVD  Respiratory: Lungs clear to auscultation	  Gastrointestinal:  Soft, Non-tender, + BS	  Vascularature:  No edema of LE  Psychiatric: Appropriate affect   Neuro: no acute focal deficits                     Labs personally reviewed      ASSESSMENT/PLAN: 	    Ms. Sosa is a 27 year old female with no significant PMH who is currently 30 weeks pregnant and presents with multiple episodes of syncope and near syncope since 12 weeks gestations. Of note, patient is high risk for she has had 2 early miscarriages and an ectopic pregnancy. She states that the syncopal episodes are usually preceded by palpitations, chest pressure, ringing in the ears, weakness in lower extremity and visual changes. The most recent syncopal episode on 5/8 was without promodal symtoms and was traumatic (patient fell down a flight of stairs). She was evaluated at [facility] where and TTE was done and discharged for OP cardiology follow up. She has just completed holter monitoring. She also reports NIELSON and mild LE edema but unsure if it is 2/2 pregnancy. Was sent in by Dr. Chahal following his initial evaluation for further workup. Patient currently denies CP, palpitations, orthopnea or pND.     1. Syncope  - Multiple syncopal episodes with most recent episode with trauma and no prodrome concerning with possible conduction or structural defects  - Some syncopal episodes a/b chest pressure and palpitations  - ECG NSR twi III, V2, V3  - TTE revealing LV systolic function is normal EF 65 %, normal RV size and function.Trace pericardial effusion noted adjacent to the posterior left ventricle.  - Appreciate EP consult and recommendations  - Monitor on tele  - CBC/CMP wnl  - Appreciate Neuro eval: 24 hour EEG pending  - S/p  ILR inserted. Wound check arranged for 6/12/23     2. High Risk Pregnancy  - Appreciate Ob/Gyn recommendations for monitoring of mother/baby      No further inpt cardiac work up planned. Awaiting final neurology recs        KAVITHA Luna DO [facility]  Cardiovascular Medicine  [address], [address]  Available through call or text on [employer] TE[employer]  Office: [phone]   DATE OF SERVICE: 05-27-23 @ 10:22    Patient is a 27y old  Female who presents with a chief complaint of Syncope (26 May 2023 17:19)      INTERVAL HISTORY: Feels ok.     REVIEW OF SYSTEMS:  CONSTITUTIONAL: No weakness  EYES/ENT: No visual changes;  No throat pain   NECK: No pain or stiffness  RESPIRATORY: No cough, wheezing; No shortness of breath  CARDIOVASCULAR: No chest pain or palpitations  GASTROINTESTINAL: No abdominal  pain. No nausea, vomiting, or hematemesis  GENITOURINARY: No dysuria, frequency or hematuria  NEUROLOGICAL: No stroke like symptoms  SKIN: No rashes    	  MEDICATIONS:        PHYSICAL EXAM:  T(C): 36.6 (05-27-23 @ 09:00), Max: 37.2 (05-26-23 @ 21:40)  HR: 100 (05-27-23 @ 09:00) (92 - 113)  BP: 102/70 (05-27-23 @ 09:00) (101/66 - 126/74)  RR: 18 (05-27-23 @ 09:00) (15 - 25)  SpO2: 98% (05-27-23 @ 09:00) (98% - 100%)  Wt(kg): --  I&O's Summary        Appearance: In no distress	  HEENT:    PERRL, EOMI	  Cardiovascular:  S1 S2, No JVD  Respiratory: Lungs clear to auscultation	  Gastrointestinal:  Soft, Non-tender, + BS	  Vascularature:  No edema of LE  Psychiatric: Appropriate affect   Neuro: no acute focal deficits                     Labs personally reviewed      ASSESSMENT/PLAN: 	    Ms. Sosa is a 27 year old female with no significant PMH who is currently 30 weeks pregnant and presents with multiple episodes of syncope and near syncope since 12 weeks gestations. Of note, patient is high risk for she has had 2 early miscarriages and an ectopic pregnancy. She states that the syncopal episodes are usually preceded by palpitations, chest pressure, ringing in the ears, weakness in lower extremity and visual changes. The most recent syncopal episode on 5/8 was without promodal symtoms and was traumatic (patient fell down a flight of stairs). She was evaluated at [facility] where and TTE was done and discharged for OP cardiology follow up. She has just completed holter monitoring. She also reports NIELSON and mild LE edema but unsure if it is 2/2 pregnancy. Was sent in by Dr. Chahal following his initial evaluation for further workup. Patient currently denies CP, palpitations, orthopnea or pND.     1. Syncope  - Multiple syncopal episodes with most recent episode with trauma and no prodrome concerning with possible conduction or structural defects  - Some syncopal episodes a/b chest pressure and palpitations  - ECG NSR twi III, V2, V3  - TTE revealing LV systolic function is normal EF 65 %, normal RV size and function. Trace pericardial effusion noted adjacent to the posterior left ventricle.  - Appreciate EP consult and recommendations  - Monitor on tele  - CBC/CMP wnl  - Appreciate Neuro eval: 24 hour EEG pending  - S/p  ILR inserted. Wound check arranged for 6/12/23     2. High Risk Pregnancy  - Appreciate Ob/Gyn recommendations for monitoring of mother/baby      No further inpt cardiac work up planned. Awaiting final neurology recs        KAVITHA Luna DO [facility]  Cardiovascular Medicine  [address], [address]  Available through call or text on [employer] TE[employer]  Office: [phone]

## 2023-05-27 NOTE — PROGRESS NOTE ADULT - ASSESSMENT
28 y/o  @30w5d with PNC c/b multiple syncopal episodes of unknown etiology admitted to antepartum service for cardiology and neurology evaluation of syncopal episodes. Last syncopal episode on . Patient without any obstetric complaints and stable this morning.     #Syncopal episodes  - Cardiology and EP following, appreciating their recommendations  - Neurology following, appreciating their recommendations (recommend vEEG)  - s/p telemetry monitoring  - TTE(): wnl, EF 65%  - f/u TFT's, Lyme AB, A1C, Lipids   - f/u vEEG    #Fetal wellbeing  - ATU(): Breech, posterior, APOORVA 15, 1678g (52%), BPP   - NICU consult PRN  - GBS (): Positive    #Maternal wellbeing  - Regular diet  - HSQ/SCDs for DVT ppx  - PNV/IronFolic acid    Carlotta Paniagua, PGY-3      26 y/o  @30w5d with PNC c/b multiple syncopal episodes of unknown etiology admitted to antepartum service for cardiology and neurology evaluation of syncopal episodes. Last syncopal episode on . Patient without any obstetric complaints and stable this morning.     #Syncopal episodes  - Cardiology and EP following, appreciating their recommendations  - Neurology following, appreciating their recommendations (recommend vEEG)  - s/p telemetry monitoring  - TTE(): wnl, EF 65%  - f/u TFT's, Lyme AB, A1C, Lipids   - f/u vEEG    #Fetal wellbeing  - ATU(): Breech, posterior, APOORVA 15, 1678g (52%), BPP   - NICU consult PRN  - GBS (): Positive    #Maternal wellbeing  - Regular diet  - HSQ/SCDs for DVT ppx  - PNV/IronFolic acid    Carlotta Paniagua, PGY-3       MFM Fellow Addendum    26 y/o  at 30w5d with pregnancy complicated by multiple syncopal episodes of unknown etiology admitted to antepartum service for cardiology and neurology evaluation of syncopal episodes. VSS. Patient without palpitations this morning and overall feeling well. She does complain of discomfort with urination and suprapubic tenderness. Patient endorses multiple UTIs treated in this pregnancy. UA and UCx reviewed and appear contaminated. Plan to resend urine culture and treat for UTI given symptoms. Appreciate recommendations from consulting services currently investigation cardiogenic vs neurogenic etiology for syncopal episodes. Fetal status reassuring.     Carly Hirschberg, MFM Fellow   NAY Sarah Attending

## 2023-05-27 NOTE — PROGRESS NOTE ADULT - SUBJECTIVE AND OBJECTIVE BOX
R3 Antepartum Note, HD#4    Patient seen and examined at bedside, no acute overnight events. No acute complaints. Pt reports +FM, denies LOF, VB, ctx, HA, epigastric pain, blurred vision, CP, SOB, N/V, fevers, and chills.    Vital Signs Last 24 Hours  T(C): 36.7 (05-27-23 @ 00:59), Max: 37.2 (05-26-23 @ 21:40)  HR: 93 (05-27-23 @ 00:59) (82 - 113)  BP: 103/68 (05-27-23 @ 00:59) (101/66 - 126/74)  RR: 18 (05-27-23 @ 00:59) (15 - 25)  SpO2: 99% (05-27-23 @ 00:59) (98% - 100%)    CAPILLARY BLOOD GLUCOSE          Physical Exam:  General: NAD  Abdomen: Soft, non-tender, gravid  Ext: No pain or swelling    NST reactive overnight    Labs:             10.4   9.28  )-----------( 173      ( 05-25 @ 06:07 )             32.9                   MEDICATIONS  (STANDING):  famotidine    Tablet 20 milliGRAM(s) Oral daily  heparin   Injectable 5000 Unit(s) SubCutaneous every 12 hours  prenatal multivitamin 1 Tablet(s) Oral daily

## 2023-05-27 NOTE — PROGRESS NOTE ADULT - SUBJECTIVE AND OBJECTIVE BOX
NEUROLOGY FOLLOW-UP CONSULT NOTE    RFC: Syncope    Interval history: No acute neurologic events overnight. No further syncopal events. Had ILR placed without incident. Still feels occasional dizziness when she stands up. vEEG placed and she reports some itchiness on her head around leads but no other issues.    Meds:  MEDICATIONS  (STANDING):  famotidine    Tablet 20 milliGRAM(s) Oral daily  heparin   Injectable 5000 Unit(s) SubCutaneous every 12 hours  nitrofurantoin monohydrate/macrocrystals (MACROBID) 100 milliGRAM(s) Oral two times a day  prenatal multivitamin 1 Tablet(s) Oral daily    MEDICATIONS  (PRN):      PMHx/PSHx/FHx/SHx:  Syncope and collapse    Handoff    MEWS Score    30w5d    Anemia    GERD (gastroesophageal reflux disease)    Polycystic ovary    Syncope    History of ovarian cystectomy    History of bilateral breast reduction surgery    DIZZINESS PALPITATIONS    [email or web address]        Allergies:  Tomatoes (Hives)  methylPREDNISolone (Hives)  latex (Rash)      ROS: All systems negative except as documented in Interval history    O:  T(C): 36.6 (05-27-23 @ 09:00), Max: 37.2 (05-26-23 @ 21:40)  HR: 100 (05-27-23 @ 09:00) (92 - 101)  BP: 102/70 (05-27-23 @ 09:00) (101/66 - 115/70)  RR: 18 (05-27-23 @ 09:00) (16 - 18)  SpO2: 98% (05-27-23 @ 09:00) (98% - 99%)    Focused neurologic exam:  MS - AAO x3, speech fluent, rep/naming intact, follows commands  CN - PERRLA, EOMI, VFF, face sens/str/hearing WNL b/l, tongue/palate midline, trap 5/5 b/l  Motor - Normal bulk/tone, 5/5 all  Sens - LT/temp intact all  DTR's - 2+ all and downgoing b/l plantar response  Coord - FtN intact b/l  Gait and station - Due to fall risk/safety concerns did not assess    Pertinent labs/studies:  CBC with low H/H 10/33 and MCV WNL, otherwise essentially WNL  BMP essentially WNL  Albumin 3.7, LFT's WNL  A1C 5.5% WNL, TSH WNL, free T4 WNL, HDL 92, LDL 77, trig 101      vEEG 5/27 -  EEG Classification / Summary:  normal EEG study, awake / drowsy / asleep    -----------------------------------------------------------------------------------------------------    Clinical Impression:  normal EEG study, awake / drowsy / asleep    There were no epileptiform abnormalities recorded.      < from: 12 Lead ECG (05.26.23 @ 10:02) >  NORMAL SINUS RHYTHM  WHEN COMPARED WITH ECG OF 26-MAY-2023 09:57, (UNCONFIRMED)  NO SIGNIFICANT CHANGE WAS FOUND    < end of copied text >      < from: TTE W or WO Ultrasound Enhancing Agent (05.25.23 @ 08:14) >   1. The left ventricular systolic function is normal with an ejection fraction visually estimated at 65 %.   2. Normal right ventricular cavity size and normal systolic function.   3. Trace pericardial effusion noted adjacent to the posterior left ventricle.   4. No prior echocardiogram is available for comparison.    < end of copied text >

## 2023-05-27 NOTE — PROGRESS NOTE ADULT - ASSESSMENT
Syncope  Dizziness  UTI  30 weeks pregnancy    - Etiology for events sounds more peripheral/vasovagal or cardiac as opposed to neurologic (seizures) in etiology. Event of tongue biting could have been from trauma as she fell down stairs or unwitnessed tonic/clonic activity. No focal neurologic deficits on exam and outside MRI report unrevealing. 5/27 - no further events, occasional dizziness, EEG unrevealing  - vEEG is normal, without evidence for focal slowing, epileptiform discharges, or seizures. PLEASE STOP  - Orthostatic vital signs, especially given complaints of dizziness  - Appreciate cardiology/EP input  - No neurologic contraindications to discharge if patient is otherwise medically and obstetrically stable. Patient can follow up with general neurology at [address] [place] [address] 1-2 weeks after discharge. Please instruct the patient to call [phone] to schedule this appointment  - Continue to address above medical problems, as you are doing  - Will sign off, please call [phone]) with additional questions or concerns

## 2023-05-27 NOTE — EEG REPORT - NS EEG TEXT BOX
OZZY ASH [id number] 27y (1995)F  Admitting MD: Dr. Pa Hammond    Study Date: 16:09 05-26-23 to 05-27-23 0800  Duration: 15:10 hrs   --------------------------------------------------------------------------------------------------  History:  CC/ HPI Patient is a 27y old  Female who presents with a chief complaint of Syncope (26 May 2023 17:19)    famotidine    Tablet 20 milliGRAM(s) Oral daily  heparin   Injectable 5000 Unit(s) SubCutaneous every 12 hours  nitrofurantoin monohydrate/macrocrystals (MACROBID) 100 milliGRAM(s) Oral two times a day  prenatal multivitamin 1 Tablet(s) Oral daily    --------------------------------------------------------------------------------------------------  Study Interpretation:    [[[Abbreviation Key:  PDR=alpha rhythm/posterior dominant rhythm. A-P=anterior posterior gradient.  Amplitude: ‘very low’:<20; ‘low’:20-50; ‘medium’:; ‘high’:>200uV.  Persistence for periodic/rhythmic patterns (% of epoch) ‘rare’:<1%; ‘occasional’:1-10%; ‘frequent’:10-50%; ‘abundant’:50-90%; ‘continuous’:>90%.  Persistence for sporadic discharges: ‘rare’:<1/hr; ‘occasional’:1/min-1/hr; ‘frequent’:>1/min; ‘abundant’:>1/10 sec.  GRDA=generalized rhythmic delta activity; FIRDA=frontal intermittent GRDA; LRDA=lateralized rhythmic delta activity; TIRDA=temporal intermittent rhythmic delta activity;  LPD=PLED=lateralized periodic discharges; GPD=generalized periodic discharges; BiPDs=BiPLEDs=bilateral independent periodic epileptiform discharges; SIRPID=stimulus induced rhythmic, periodic, or ictal appearing discharges; BIRDs=brief potentially ictal rhythmic discharges >4 Hz, lasting .5-10s; PFA (paroxysmal bursts >13 Hz or =8 Hz).  Modifiers: +F=with fast component; +S=with spike component; +R=with rhythmic component.  S-B=burst suppression pattern.  Max=maximal. N1-drowsy; N2-stage II sleep; N3-slow wave sleep. SSS/BETS=small sharp spikes/benign epileptiform transients of sleep. HV=hyperventilation; PS=photic stimulation]]]    FINDINGS:  The background was continuous, spontaneously variable and reactive.  During wakefulness, the posteriorly dominant rhythm consisted of symmetric, well modulated 9 Hz activity, with an amplitude to 40 uV, that attenuated to eye opening.  Low amplitude central beta was noted in wakefulness.    Background Slowing:  Generalized slowing: none was present.  Focal slowing: none was present.    Sleep Background:  -Drowsiness was characterized by fragmentation, attenuation, and slowing of the background activity.    -N2 was characterized by the presence of vertex waves, symmetric spindles, and K-complexes.    Epileptiform Activity:   No interictal epileptiform discharges were present.    Events:  No clinical events were recorded.  No seizures were recorded.    Activation Procedures:   -Hyperventilation was not performed.    -Photic stimulation was not performed.    Artifacts:  Intermittent myogenic and external motion artifacts were noted.    ECG:  The heart rate on single channel ECG at baseline was predominantly near BPM = 60-80  -----------------------------------------------------------------------------------------------------    EEG Classification / Summary:  normal EEG study, awake / drowsy / asleep    -----------------------------------------------------------------------------------------------------    Clinical Impression:  normal EEG study, awake / drowsy / asleep    There were no epileptiform abnormalities recorded.      -------------------------------------------------------------------------------------------------------  [facility] EEG Reading Room Ph#: [phone]  Lucho Pena MD  Epilepsy Fellow , [facility]  Department of Neurology, [facility]    [facility] EEG Reading Room Ph#: [phone]  Epilepsy Answering Service after 5PM and before 8:30AM: Ph#: [phone]   OZZY ASH [id number] 27y (1995)F  Admitting MD: Dr. Pa Hammond    Study Date: 16:09 05-26-23 to 05-27-23 0800  Duration: 15:10 hrs   --------------------------------------------------------------------------------------------------  History:  CC/ HPI Patient is a 27y old  Female who presents with a chief complaint of Syncope (26 May 2023 17:19)    famotidine    Tablet 20 milliGRAM(s) Oral daily  heparin   Injectable 5000 Unit(s) SubCutaneous every 12 hours  nitrofurantoin monohydrate/macrocrystals (MACROBID) 100 milliGRAM(s) Oral two times a day  prenatal multivitamin 1 Tablet(s) Oral daily    --------------------------------------------------------------------------------------------------  Study Interpretation:    [[[Abbreviation Key:  PDR=alpha rhythm/posterior dominant rhythm. A-P=anterior posterior gradient.  Amplitude: ‘very low’:<20; ‘low’:20-50; ‘medium’:; ‘high’:>200uV.  Persistence for periodic/rhythmic patterns (% of epoch) ‘rare’:<1%; ‘occasional’:1-10%; ‘frequent’:10-50%; ‘abundant’:50-90%; ‘continuous’:>90%.  Persistence for sporadic discharges: ‘rare’:<1/hr; ‘occasional’:1/min-1/hr; ‘frequent’:>1/min; ‘abundant’:>1/10 sec.  GRDA=generalized rhythmic delta activity; FIRDA=frontal intermittent GRDA; LRDA=lateralized rhythmic delta activity; TIRDA=temporal intermittent rhythmic delta activity;  LPD=PLED=lateralized periodic discharges; GPD=generalized periodic discharges; BiPDs=BiPLEDs=bilateral independent periodic epileptiform discharges; SIRPID=stimulus induced rhythmic, periodic, or ictal appearing discharges; BIRDs=brief potentially ictal rhythmic discharges >4 Hz, lasting .5-10s; PFA (paroxysmal bursts >13 Hz or =8 Hz).  Modifiers: +F=with fast component; +S=with spike component; +R=with rhythmic component.  S-B=burst suppression pattern.  Max=maximal. N1-drowsy; N2-stage II sleep; N3-slow wave sleep. SSS/BETS=small sharp spikes/benign epileptiform transients of sleep. HV=hyperventilation; PS=photic stimulation]]]    FINDINGS:  The background was continuous, spontaneously variable and reactive.  During wakefulness, the posteriorly dominant rhythm consisted of symmetric, well modulated 9 Hz activity, with an amplitude to 40 uV, that attenuated to eye opening.  Low amplitude central beta was noted in wakefulness.    Background Slowing:  Generalized slowing: none was present.  Focal slowing: none was present.    Sleep Background:  -Drowsiness was characterized by fragmentation, attenuation, and slowing of the background activity.    -N2 was characterized by the presence of vertex waves, symmetric spindles, and K-complexes.    Epileptiform Activity:   No interictal epileptiform discharges were present.    Events:  No clinical events were recorded.  No seizures were recorded.    Activation Procedures:   -Hyperventilation was not performed.    -Photic stimulation was not performed.    Artifacts:  Intermittent myogenic and external motion artifacts were noted.    ECG:  The heart rate on single channel ECG at baseline was predominantly near BPM = 60-80  -----------------------------------------------------------------------------------------------------    EEG Classification / Summary:  normal EEG study, awake / drowsy / asleep    -----------------------------------------------------------------------------------------------------    Clinical Impression:  normal EEG study, awake / drowsy / asleep    There were no epileptiform abnormalities recorded.      -------------------------------------------------------------------------------------------------------  [facility] EEG Reading Room Ph#: [phone]  Lucho Pena MD  Epilepsy Fellow , [facility]  Department of Neurology, [facility]    [facility] EEG Reading Room Ph#: [phone]  Epilepsy Answering Service after 5PM and before 8:30AM: Ph#: [phone]   OZZY ASH [id number] 27y (1995)F  Admitting MD: Dr. Pa Hammond    Study Date: 16:09 05-26-23 to 05-27-23 0800  Duration: 21:10 hrs   --------------------------------------------------------------------------------------------------  History:  CC/ HPI Patient is a 27y old  Female who presents with a chief complaint of Syncope (26 May 2023 17:19)    famotidine    Tablet 20 milliGRAM(s) Oral daily  heparin   Injectable 5000 Unit(s) SubCutaneous every 12 hours  nitrofurantoin monohydrate/macrocrystals (MACROBID) 100 milliGRAM(s) Oral two times a day  prenatal multivitamin 1 Tablet(s) Oral daily    --------------------------------------------------------------------------------------------------  Study Interpretation:    [[[Abbreviation Key:  PDR=alpha rhythm/posterior dominant rhythm. A-P=anterior posterior gradient.  Amplitude: ‘very low’:<20; ‘low’:20-50; ‘medium’:; ‘high’:>200uV.  Persistence for periodic/rhythmic patterns (% of epoch) ‘rare’:<1%; ‘occasional’:1-10%; ‘frequent’:10-50%; ‘abundant’:50-90%; ‘continuous’:>90%.  Persistence for sporadic discharges: ‘rare’:<1/hr; ‘occasional’:1/min-1/hr; ‘frequent’:>1/min; ‘abundant’:>1/10 sec.  GRDA=generalized rhythmic delta activity; FIRDA=frontal intermittent GRDA; LRDA=lateralized rhythmic delta activity; TIRDA=temporal intermittent rhythmic delta activity;  LPD=PLED=lateralized periodic discharges; GPD=generalized periodic discharges; BiPDs=BiPLEDs=bilateral independent periodic epileptiform discharges; SIRPID=stimulus induced rhythmic, periodic, or ictal appearing discharges; BIRDs=brief potentially ictal rhythmic discharges >4 Hz, lasting .5-10s; PFA (paroxysmal bursts >13 Hz or =8 Hz).  Modifiers: +F=with fast component; +S=with spike component; +R=with rhythmic component.  S-B=burst suppression pattern.  Max=maximal. N1-drowsy; N2-stage II sleep; N3-slow wave sleep. SSS/BETS=small sharp spikes/benign epileptiform transients of sleep. HV=hyperventilation; PS=photic stimulation]]]    FINDINGS:  The background was continuous, spontaneously variable and reactive.  During wakefulness, the posteriorly dominant rhythm consisted of symmetric, well modulated 9 Hz activity, with an amplitude to 40 uV, that attenuated to eye opening.  Low amplitude central beta was noted in wakefulness.    Background Slowing:  Generalized slowing: none was present.  Focal slowing: none was present.    Sleep Background:  -Drowsiness was characterized by fragmentation, attenuation, and slowing of the background activity.    -N2 was characterized by the presence of vertex waves, symmetric spindles, and K-complexes.    Epileptiform Activity:   No interictal epileptiform discharges were present.    Events:  No clinical events were recorded.  No seizures were recorded.    Activation Procedures:   -Hyperventilation was not performed.    -Photic stimulation was not performed.    Artifacts:  Intermittent myogenic and external motion artifacts were noted.    ECG:  The heart rate on single channel ECG at baseline was predominantly near BPM = 60-80  -----------------------------------------------------------------------------------------------------    EEG Classification / Summary:  normal EEG study, awake / drowsy / asleep    -----------------------------------------------------------------------------------------------------    Clinical Impression:  normal EEG study, awake / drowsy / asleep    There were no epileptiform abnormalities recorded.      -------------------------------------------------------------------------------------------------------  [facility] EEG Reading Room Ph#: [phone]  Lcuho Pena MD  Epilepsy Fellow , [facility]  Department of Neurology, [facility] [employer] [facility]    [facility] EEG Reading Room Ph#: [phone]  Epilepsy Answering Service after 5PM and before 8:30AM: Ph#: [phone]   OZZY ASH [id number] 27y (1995)F  Admitting MD: Dr. Pa Hammond    Study Date: 16:09 05-26-23 to 05-27-23 0800  Duration: 21:10 hrs   --------------------------------------------------------------------------------------------------  History:  CC/ HPI Patient is a 27y old  Female who presents with a chief complaint of Syncope (26 May 2023 17:19)    famotidine    Tablet 20 milliGRAM(s) Oral daily  heparin   Injectable 5000 Unit(s) SubCutaneous every 12 hours  nitrofurantoin monohydrate/macrocrystals (MACROBID) 100 milliGRAM(s) Oral two times a day  prenatal multivitamin 1 Tablet(s) Oral daily    --------------------------------------------------------------------------------------------------  Study Interpretation:    [[[Abbreviation Key:  PDR=alpha rhythm/posterior dominant rhythm. A-P=anterior posterior gradient.  Amplitude: ‘very low’:<20; ‘low’:20-50; ‘medium’:; ‘high’:>200uV.  Persistence for periodic/rhythmic patterns (% of epoch) ‘rare’:<1%; ‘occasional’:1-10%; ‘frequent’:10-50%; ‘abundant’:50-90%; ‘continuous’:>90%.  Persistence for sporadic discharges: ‘rare’:<1/hr; ‘occasional’:1/min-1/hr; ‘frequent’:>1/min; ‘abundant’:>1/10 sec.  GRDA=generalized rhythmic delta activity; FIRDA=frontal intermittent GRDA; LRDA=lateralized rhythmic delta activity; TIRDA=temporal intermittent rhythmic delta activity;  LPD=PLED=lateralized periodic discharges; GPD=generalized periodic discharges; BiPDs=BiPLEDs=bilateral independent periodic epileptiform discharges; SIRPID=stimulus induced rhythmic, periodic, or ictal appearing discharges; BIRDs=brief potentially ictal rhythmic discharges >4 Hz, lasting .5-10s; PFA (paroxysmal bursts >13 Hz or =8 Hz).  Modifiers: +F=with fast component; +S=with spike component; +R=with rhythmic component.  S-B=burst suppression pattern.  Max=maximal. N1-drowsy; N2-stage II sleep; N3-slow wave sleep. SSS/BETS=small sharp spikes/benign epileptiform transients of sleep. HV=hyperventilation; PS=photic stimulation]]]    FINDINGS:  The background was continuous, spontaneously variable and reactive.  During wakefulness, the posteriorly dominant rhythm consisted of symmetric, well modulated 9 Hz activity, with an amplitude to 40 uV, that attenuated to eye opening.  Low amplitude central beta was noted in wakefulness.    Background Slowing:  Generalized slowing: none was present.  Focal slowing: none was present.    Sleep Background:  -Drowsiness was characterized by fragmentation, attenuation, and slowing of the background activity.    -N2 was characterized by the presence of vertex waves, symmetric spindles, and K-complexes.    Epileptiform Activity:   No interictal epileptiform discharges were present.    Events:  No clinical events were recorded.  No seizures were recorded.    Activation Procedures:   -Hyperventilation was not performed.    -Photic stimulation was not performed.    Artifacts:  Intermittent myogenic and external motion artifacts were noted.    ECG:  The heart rate on single channel ECG at baseline was predominantly near BPM = 60-80  -----------------------------------------------------------------------------------------------------    EEG Classification / Summary:  normal EEG study, awake / drowsy / asleep    -----------------------------------------------------------------------------------------------------    Clinical Impression:  normal EEG study, awake / drowsy / asleep    There were no epileptiform abnormalities recorded.      -------------------------------------------------------------------------------------------------------  [facility] EEG Reading Room Ph#: [phone]  Lucho Pena MD  Epilepsy Fellow , [facility]  Department of Neurology, [facility]    Harley Sandoval MD  Neurology Attending Physician        [facility] EEG Reading Room Ph#: [phone]  Epilepsy Answering Service after 5PM and before 8:30AM: Ph#: [phone]

## 2023-05-28 LAB
CULTURE RESULTS: SIGNIFICANT CHANGE UP
SPECIMEN SOURCE: SIGNIFICANT CHANGE UP

## 2023-05-30 PROBLEM — Z00.00 ENCOUNTER FOR PREVENTIVE HEALTH EXAMINATION: Noted: 2023-05-30

## 2023-05-31 PROBLEM — R55 SYNCOPE AND COLLAPSE: Status: ACTIVE | Noted: 2023-05-24

## 2023-05-31 NOTE — DISCUSSION/SUMMARY
[FreeTextEntry1] : In summary, this is a 27  year old 30 weeks pregnant woman who presented today for recurrent syncope since she was 12 weeks pregnant. The etiology of her syncope is no clear to me at this time. Her cardiac work up thus far has been negative. Her EKG shows some t wave changes, but otherwise no significant abnormalities to explain her syncope. He echo was unremarkable. She had previously had a 30 day [employer] monitor with Dr Kye Ayala which revealed only a single dropped beat during sleep, which I do not believe requires pacing or explains her syncope. I offerred her to have an ILR placed tomorrow, given her recurrent symptoms, which she is agreeable to. We discussed the importance of not walking alone given her recurrent syncope and danger to her self and her baby. I would like to get established with a general cardiologist and see a neurologist ASAP. She will see Dr Chahal from cardiology today. She has a history of two miscarriages in the past, and I am not sure if the syncope she is experiencing is related to that or not. \par  \par Ms. Sosa appeared to understand the whole discussion and verbalized that all of her questions were answered to her satisfaction.\par  \par Thank you for allowing me to be involved in the care of this pleasant woman. Please feel free to contact me with any questions.\par  [EKG obtained to assist in diagnosis and management of assessed problem(s)] : EKG obtained to assist in diagnosis and management of assessed problem(s)

## 2023-05-31 NOTE — HISTORY OF PRESENT ILLNESS
[FreeTextEntry1] : Ms. Farrell was seen in the [facility] today. For our records, please allow me to summarize the history and my findings.\par  \par This pleasant 27 year old woman with a cardiovascular history significant for recurrent syncope who is currently 30 weeks pregnant. She states that since about 12 weeks of pregnanry, she has had about 5 episodes of syncope. the first 4 episodes occurred with onset of palpitations, followed by dizziness and loss of consciousnous for several seconds. She saw a cardiologist in [place], and she worke a 30 day event monitor which revealed no significant arrhythmias. She had a single dropped beat while asleep, which prompted her previous provider to tell her she needed a pacemaker. She had an episode of syncope while wearing the monitor which coorelated to sinus tachycardia. unfortunelty, her last episode of syncope occurred after she had taken off the event monitor. This episode occurred wihtout a prodrome. She was opening the door to her house when she suddenly passed out and fell down the stairs. She was hospitalized for this episode, during which she underwnet a cardiac and neurologic work up which were unremarkable. She was told she should get a pacemaker based on her previous event monitor. She comes today to seek a second opinion. \par  \par Ms. Sosa denies any recent history of chest pain, shortness of breath. \par \par

## 2023-05-31 NOTE — CARDIOLOGY SUMMARY
[de-identified] : 5/24/23: Sinus rhythm at 94 bpm, nonspecific t wave changes [de-identified] : 2023: EF 60-65%, no significant valvular disease

## 2023-06-02 ENCOUNTER — APPOINTMENT (OUTPATIENT)
Dept: CARDIOLOGY | Facility: CLINIC | Age: 28
End: 2023-06-02

## 2023-06-02 ENCOUNTER — NON-APPOINTMENT (OUTPATIENT)
Age: 28
End: 2023-06-02

## 2023-06-05 PROBLEM — K21.9 GASTRO-ESOPHAGEAL REFLUX DISEASE WITHOUT ESOPHAGITIS: Chronic | Status: ACTIVE | Noted: 2023-05-24

## 2023-06-05 PROBLEM — E28.2 POLYCYSTIC OVARIAN SYNDROME: Chronic | Status: ACTIVE | Noted: 2023-05-24

## 2023-06-05 PROBLEM — D64.9 ANEMIA, UNSPECIFIED: Chronic | Status: ACTIVE | Noted: 2023-05-24

## 2023-06-06 ENCOUNTER — NON-APPOINTMENT (OUTPATIENT)
Age: 28
End: 2023-06-06

## 2023-06-06 ENCOUNTER — LABORATORY RESULT (OUTPATIENT)
Age: 28
End: 2023-06-06

## 2023-06-06 ENCOUNTER — APPOINTMENT (OUTPATIENT)
Dept: CARDIOLOGY | Facility: CLINIC | Age: 28
End: 2023-06-06
Payer: COMMERCIAL

## 2023-06-06 VITALS
HEIGHT: 66 IN | DIASTOLIC BLOOD PRESSURE: 70 MMHG | OXYGEN SATURATION: 99 % | HEART RATE: 86 BPM | SYSTOLIC BLOOD PRESSURE: 100 MMHG | BODY MASS INDEX: 31.82 KG/M2 | TEMPERATURE: 98.5 F | WEIGHT: 198 LBS

## 2023-06-06 DIAGNOSIS — R90.89 OTHER ABNORMAL FINDINGS ON DIAGNOSTIC IMAGING OF CENTRAL NERVOUS SYSTEM: ICD-10-CM

## 2023-06-06 PROCEDURE — 93000 ELECTROCARDIOGRAM COMPLETE: CPT

## 2023-06-06 PROCEDURE — 99214 OFFICE O/P EST MOD 30 MIN: CPT | Mod: 25

## 2023-06-06 NOTE — HISTORY OF PRESENT ILLNESS
[FreeTextEntry1] : Domenica is a 27 y.o female, 3rd trimester, (OBGYN Dr. Markus Garcia) w/MHx of Syncope, Miscarriage, Ectopic pregnancy who presents for follow up. 5/24/2023 - 5/27/2023 was in [facility] for syncope. EEG performed, nL, ILR placed per Dr. Leblanc. s/p TTE LVEF nL, trace pericardial effusion noted. Anemia noted. Since last visit reports no syncope episodes. Was advised bed rest. Denies chest pain, palpitations, diaphoresis, vision changes, HA, dizziness, cough, wheezing, SOB/NIELSON, edema, fatigue, fever, chills, infection.

## 2023-06-08 ENCOUNTER — APPOINTMENT (OUTPATIENT)
Dept: ELECTROPHYSIOLOGY | Facility: CLINIC | Age: 28
End: 2023-06-08

## 2023-06-08 LAB
ALBUMIN SERPL ELPH-MCNC: 3.8 G/DL
ALP BLD-CCNC: 108 U/L
ALT SERPL-CCNC: 43 U/L
ANION GAP SERPL CALC-SCNC: 12 MMOL/L
AST SERPL-CCNC: 23 U/L
BILIRUB SERPL-MCNC: <0.2 MG/DL
BUN SERPL-MCNC: 6 MG/DL
CALCIUM SERPL-MCNC: 9 MG/DL
CHLORIDE SERPL-SCNC: 107 MMOL/L
CHOLEST SERPL-MCNC: 195 MG/DL
CK SERPL-CCNC: 32 U/L
CO2 SERPL-SCNC: 18 MMOL/L
CREAT SERPL-MCNC: 0.5 MG/DL
EGFR: 130 ML/MIN/1.73M2
ESTIMATED AVERAGE GLUCOSE: 108 MG/DL
GLUCOSE SERPL-MCNC: 67 MG/DL
HBA1C MFR BLD HPLC: 5.4 %
HDLC SERPL-MCNC: 99 MG/DL
LDLC SERPL CALC-MCNC: 78 MG/DL
LDLC SERPL DIRECT ASSAY-MCNC: 85 MG/DL
MAGNESIUM SERPL-MCNC: 1.7 MG/DL
NONHDLC SERPL-MCNC: 96 MG/DL
POTASSIUM SERPL-SCNC: 4 MMOL/L
PROT SERPL-MCNC: 6.9 G/DL
SODIUM SERPL-SCNC: 137 MMOL/L
TRIGL SERPL-MCNC: 87 MG/DL
TSH SERPL-ACNC: 2.79 UIU/ML

## 2023-06-09 ENCOUNTER — NON-APPOINTMENT (OUTPATIENT)
Age: 28
End: 2023-06-09

## 2023-06-11 ENCOUNTER — RESULT CHARGE (OUTPATIENT)
Age: 28
End: 2023-06-11

## 2023-06-12 ENCOUNTER — TRANSCRIPTION ENCOUNTER (OUTPATIENT)
Age: 28
End: 2023-06-12

## 2023-06-12 ENCOUNTER — APPOINTMENT (OUTPATIENT)
Dept: ELECTROPHYSIOLOGY | Facility: CLINIC | Age: 28
End: 2023-06-12
Payer: COMMERCIAL

## 2023-06-12 ENCOUNTER — NON-APPOINTMENT (OUTPATIENT)
Age: 28
End: 2023-06-12

## 2023-06-12 VITALS
OXYGEN SATURATION: 99 % | BODY MASS INDEX: 31.5 KG/M2 | WEIGHT: 196 LBS | HEIGHT: 66 IN | HEART RATE: 95 BPM | DIASTOLIC BLOOD PRESSURE: 76 MMHG | SYSTOLIC BLOOD PRESSURE: 114 MMHG

## 2023-06-12 PROCEDURE — 99024 POSTOP FOLLOW-UP VISIT: CPT

## 2023-06-12 PROCEDURE — 93000 ELECTROCARDIOGRAM COMPLETE: CPT | Mod: 59

## 2023-06-12 PROCEDURE — 93285 PRGRMG DEV EVAL SCRMS IP: CPT

## 2023-06-30 ENCOUNTER — APPOINTMENT (OUTPATIENT)
Dept: CARDIOLOGY | Facility: CLINIC | Age: 28
End: 2023-06-30
Payer: COMMERCIAL

## 2023-06-30 ENCOUNTER — NON-APPOINTMENT (OUTPATIENT)
Age: 28
End: 2023-06-30

## 2023-06-30 VITALS
OXYGEN SATURATION: 98 % | HEART RATE: 116 BPM | WEIGHT: 204 LBS | BODY MASS INDEX: 32.78 KG/M2 | TEMPERATURE: 98.1 F | SYSTOLIC BLOOD PRESSURE: 106 MMHG | HEIGHT: 66 IN | DIASTOLIC BLOOD PRESSURE: 68 MMHG

## 2023-06-30 DIAGNOSIS — D64.9 ANEMIA, UNSPECIFIED: ICD-10-CM

## 2023-06-30 DIAGNOSIS — Z34.93 ENCOUNTER FOR SUPERVISION OF NORMAL PREGNANCY, UNSPECIFIED, THIRD TRIMESTER: ICD-10-CM

## 2023-06-30 DIAGNOSIS — R55 SYNCOPE AND COLLAPSE: ICD-10-CM

## 2023-06-30 PROCEDURE — 99214 OFFICE O/P EST MOD 30 MIN: CPT | Mod: 25

## 2023-06-30 PROCEDURE — 93000 ELECTROCARDIOGRAM COMPLETE: CPT

## 2023-06-30 NOTE — HISTORY OF PRESENT ILLNESS
[FreeTextEntry1] : This is a 27 y.o female, 3rd trimester ( 35 weeks) , (OBGYN Dr. Markus Garcia) w/PMHx of Syncope, Miscarriage, Ectopic pregnancy who presents for follow up. pt with+, ILR placed per Dr. Leblanc. s/p TTE LVEF nL, trace pericardial effusion noted. Anemia noted. as per EP Notes-- Remote monitoring alert received for 3 symptom activated events. EGMs consistent with SR/ST. ST from 100s-130s with gradual rate fluctuation.  a brief beat of 2:1 AVB. Analysis of the event: 1 P wave with no associated QRS complex, followed by a beat with a P wave hidden in the QRS complex. sudden rate drop to 45bpm with next beats coming in at a rate of 80s. as per notes-- Patient was in bed at the time and was feeling palpitations and lightheadedness, but no syncope or near syncope. \par Pt continues to report daily dizziness reports some SOB reports sx occur when she is sitting down \par To have C section july 17th

## 2023-07-17 ENCOUNTER — APPOINTMENT (OUTPATIENT)
Dept: ELECTROPHYSIOLOGY | Facility: CLINIC | Age: 28
End: 2023-07-17
Payer: COMMERCIAL

## 2023-07-17 ENCOUNTER — NON-APPOINTMENT (OUTPATIENT)
Age: 28
End: 2023-07-17

## 2023-07-17 PROCEDURE — 93298 REM INTERROG DEV EVAL SCRMS: CPT

## 2023-07-17 PROCEDURE — G2066: CPT

## 2023-08-21 ENCOUNTER — NON-APPOINTMENT (OUTPATIENT)
Age: 28
End: 2023-08-21

## 2023-08-21 ENCOUNTER — APPOINTMENT (OUTPATIENT)
Dept: CARDIOLOGY | Facility: CLINIC | Age: 28
End: 2023-08-21

## 2023-08-21 ENCOUNTER — APPOINTMENT (OUTPATIENT)
Dept: ELECTROPHYSIOLOGY | Facility: CLINIC | Age: 28
End: 2023-08-21
Payer: COMMERCIAL

## 2023-08-22 PROCEDURE — G2066: CPT

## 2023-08-22 PROCEDURE — 93298 REM INTERROG DEV EVAL SCRMS: CPT

## 2023-09-12 ENCOUNTER — APPOINTMENT (OUTPATIENT)
Dept: CARDIOLOGY | Facility: CLINIC | Age: 28
End: 2023-09-12

## 2023-09-25 ENCOUNTER — APPOINTMENT (OUTPATIENT)
Dept: ELECTROPHYSIOLOGY | Facility: CLINIC | Age: 28
End: 2023-09-25
Payer: COMMERCIAL

## 2023-09-25 ENCOUNTER — NON-APPOINTMENT (OUTPATIENT)
Age: 28
End: 2023-09-25

## 2023-09-26 PROCEDURE — 93298 REM INTERROG DEV EVAL SCRMS: CPT

## 2023-09-26 PROCEDURE — G2066: CPT

## 2023-10-30 ENCOUNTER — NON-APPOINTMENT (OUTPATIENT)
Age: 28
End: 2023-10-30

## 2023-10-30 ENCOUNTER — APPOINTMENT (OUTPATIENT)
Dept: ELECTROPHYSIOLOGY | Facility: CLINIC | Age: 28
End: 2023-10-30
Payer: COMMERCIAL

## 2023-10-31 PROCEDURE — G2066: CPT

## 2023-10-31 PROCEDURE — 93298 REM INTERROG DEV EVAL SCRMS: CPT | Mod: NC

## 2023-12-04 ENCOUNTER — APPOINTMENT (OUTPATIENT)
Dept: ELECTROPHYSIOLOGY | Facility: CLINIC | Age: 28
End: 2023-12-04
Payer: COMMERCIAL

## 2023-12-04 ENCOUNTER — NON-APPOINTMENT (OUTPATIENT)
Age: 28
End: 2023-12-04

## 2023-12-05 PROCEDURE — 93298 REM INTERROG DEV EVAL SCRMS: CPT

## 2023-12-05 PROCEDURE — G2066: CPT

## 2023-12-19 ENCOUNTER — APPOINTMENT (OUTPATIENT)
Dept: CARDIOLOGY | Facility: CLINIC | Age: 28
End: 2023-12-19

## 2024-01-05 ENCOUNTER — APPOINTMENT (OUTPATIENT)
Dept: ELECTROPHYSIOLOGY | Facility: CLINIC | Age: 29
End: 2024-01-05
Payer: COMMERCIAL

## 2024-01-05 ENCOUNTER — NON-APPOINTMENT (OUTPATIENT)
Age: 29
End: 2024-01-05

## 2024-01-05 PROCEDURE — 93298 REM INTERROG DEV EVAL SCRMS: CPT

## 2024-02-06 ENCOUNTER — APPOINTMENT (OUTPATIENT)
Dept: ELECTROPHYSIOLOGY | Facility: CLINIC | Age: 29
End: 2024-02-06
Payer: COMMERCIAL

## 2024-02-06 ENCOUNTER — NON-APPOINTMENT (OUTPATIENT)
Age: 29
End: 2024-02-06

## 2024-02-07 PROCEDURE — 93298 REM INTERROG DEV EVAL SCRMS: CPT

## 2024-03-12 ENCOUNTER — NON-APPOINTMENT (OUTPATIENT)
Age: 29
End: 2024-03-12

## 2024-03-12 ENCOUNTER — APPOINTMENT (OUTPATIENT)
Dept: ELECTROPHYSIOLOGY | Facility: CLINIC | Age: 29
End: 2024-03-12
Payer: COMMERCIAL

## 2024-03-12 PROCEDURE — 93298 REM INTERROG DEV EVAL SCRMS: CPT

## 2024-04-15 ENCOUNTER — APPOINTMENT (OUTPATIENT)
Dept: ELECTROPHYSIOLOGY | Facility: CLINIC | Age: 29
End: 2024-04-15
Payer: COMMERCIAL

## 2024-04-15 ENCOUNTER — NON-APPOINTMENT (OUTPATIENT)
Age: 29
End: 2024-04-15

## 2024-04-15 PROCEDURE — 93298 REM INTERROG DEV EVAL SCRMS: CPT

## 2024-05-20 ENCOUNTER — NON-APPOINTMENT (OUTPATIENT)
Age: 29
End: 2024-05-20

## 2024-05-20 ENCOUNTER — APPOINTMENT (OUTPATIENT)
Dept: ELECTROPHYSIOLOGY | Facility: CLINIC | Age: 29
End: 2024-05-20
Payer: COMMERCIAL

## 2024-05-20 PROCEDURE — 93298 REM INTERROG DEV EVAL SCRMS: CPT

## 2024-06-24 ENCOUNTER — NON-APPOINTMENT (OUTPATIENT)
Age: 29
End: 2024-06-24

## 2024-06-24 ENCOUNTER — APPOINTMENT (OUTPATIENT)
Dept: ELECTROPHYSIOLOGY | Facility: CLINIC | Age: 29
End: 2024-06-24
Payer: COMMERCIAL

## 2024-06-24 PROCEDURE — 93298 REM INTERROG DEV EVAL SCRMS: CPT

## 2024-07-26 ENCOUNTER — APPOINTMENT (OUTPATIENT)
Dept: ELECTROPHYSIOLOGY | Facility: CLINIC | Age: 29
End: 2024-07-26

## 2024-08-30 ENCOUNTER — APPOINTMENT (OUTPATIENT)
Dept: ELECTROPHYSIOLOGY | Facility: CLINIC | Age: 29
End: 2024-08-30

## 2024-10-03 ENCOUNTER — APPOINTMENT (OUTPATIENT)
Dept: ELECTROPHYSIOLOGY | Facility: CLINIC | Age: 29
End: 2024-10-03
Payer: COMMERCIAL

## 2024-10-03 ENCOUNTER — NON-APPOINTMENT (OUTPATIENT)
Age: 29
End: 2024-10-03

## 2024-10-03 PROCEDURE — 93298 REM INTERROG DEV EVAL SCRMS: CPT

## 2024-11-04 ENCOUNTER — APPOINTMENT (OUTPATIENT)
Dept: ELECTROPHYSIOLOGY | Facility: CLINIC | Age: 29
End: 2024-11-04

## 2024-12-09 ENCOUNTER — APPOINTMENT (OUTPATIENT)
Dept: ELECTROPHYSIOLOGY | Facility: CLINIC | Age: 29
End: 2024-12-09

## 2025-01-10 ENCOUNTER — APPOINTMENT (OUTPATIENT)
Dept: ELECTROPHYSIOLOGY | Facility: CLINIC | Age: 30
End: 2025-01-10

## 2025-02-10 ENCOUNTER — APPOINTMENT (OUTPATIENT)
Dept: ELECTROPHYSIOLOGY | Facility: CLINIC | Age: 30
End: 2025-02-10

## 2025-03-12 ENCOUNTER — APPOINTMENT (OUTPATIENT)
Dept: ELECTROPHYSIOLOGY | Facility: CLINIC | Age: 30
End: 2025-03-12